# Patient Record
Sex: FEMALE | Race: WHITE | NOT HISPANIC OR LATINO | URBAN - METROPOLITAN AREA
[De-identification: names, ages, dates, MRNs, and addresses within clinical notes are randomized per-mention and may not be internally consistent; named-entity substitution may affect disease eponyms.]

---

## 2017-03-07 ENCOUNTER — NEW REFERRAL (OUTPATIENT)
Dept: URBAN - METROPOLITAN AREA CLINIC 8 | Facility: CLINIC | Age: 82
End: 2017-03-07

## 2017-03-07 DIAGNOSIS — H25.9: ICD-10-CM

## 2017-03-07 DIAGNOSIS — H34.8320: ICD-10-CM

## 2017-03-07 DIAGNOSIS — H35.352: ICD-10-CM

## 2017-03-07 PROCEDURE — 92134 CPTRZ OPH DX IMG PST SGM RTA: CPT

## 2017-03-07 PROCEDURE — 92225 OPHTHALMOSCOPY (INITIAL): CPT

## 2017-03-07 PROCEDURE — G8482 FLU IMMUNIZE ORDER/ADMIN: HCPCS

## 2017-03-07 PROCEDURE — 99204 OFFICE O/P NEW MOD 45 MIN: CPT

## 2017-03-07 PROCEDURE — 1036F TOBACCO NON-USER: CPT

## 2017-03-07 PROCEDURE — 4040F PNEUMOC VAC/ADMIN/RCVD: CPT | Mod: 8P

## 2017-03-07 PROCEDURE — G8427 DOCREV CUR MEDS BY ELIG CLIN: HCPCS

## 2017-03-07 ASSESSMENT — TONOMETRY
OS_IOP_MMHG: 14
OD_IOP_MMHG: 15

## 2017-03-07 ASSESSMENT — VISUAL ACUITY
OS_SC: CF 1FT
OD_CC: 20/25-2
OS_CC: 20/800
OD_SC: 20/50

## 2020-10-21 ENCOUNTER — NEW REFERRAL (OUTPATIENT)
Dept: URBAN - METROPOLITAN AREA CLINIC 8 | Facility: CLINIC | Age: 85
End: 2020-10-21

## 2020-10-21 VITALS — HEIGHT: 55 IN

## 2020-10-21 DIAGNOSIS — H34.8320: ICD-10-CM

## 2020-10-21 PROCEDURE — 92134 CPTRZ OPH DX IMG PST SGM RTA: CPT

## 2020-10-21 PROCEDURE — PFS EYLEA PFS

## 2020-10-21 PROCEDURE — 92202 OPSCPY EXTND ON/MAC DRAW: CPT

## 2020-10-21 PROCEDURE — 67028 INJECTION EYE DRUG: CPT

## 2020-10-21 PROCEDURE — 92004 COMPRE OPH EXAM NEW PT 1/>: CPT | Mod: 25

## 2020-10-21 ASSESSMENT — TONOMETRY
OS_IOP_MMHG: 13
OD_IOP_MMHG: 13

## 2020-10-21 ASSESSMENT — VISUAL ACUITY
OS_SC: 4/200
OS_CC: 20/200
OD_CC: 20/40
OD_SC: 20/50-2

## 2021-10-20 ENCOUNTER — FOLLOW UP (OUTPATIENT)
Dept: URBAN - METROPOLITAN AREA CLINIC 8 | Facility: CLINIC | Age: 86
End: 2021-10-20

## 2021-10-20 DIAGNOSIS — H25.9: ICD-10-CM

## 2021-10-20 DIAGNOSIS — Z96.1: ICD-10-CM

## 2021-10-20 DIAGNOSIS — H34.8320: ICD-10-CM

## 2021-10-20 PROCEDURE — 92014 COMPRE OPH EXAM EST PT 1/>: CPT | Mod: 25

## 2021-10-20 PROCEDURE — 67028 INJECTION EYE DRUG: CPT

## 2021-10-20 PROCEDURE — 92134 CPTRZ OPH DX IMG PST SGM RTA: CPT

## 2021-10-20 ASSESSMENT — TONOMETRY
OS_IOP_MMHG: 12
OD_IOP_MMHG: 15

## 2021-10-20 ASSESSMENT — VISUAL ACUITY
OD_SC: 20/150
OS_SC: 2/200

## 2022-11-04 ENCOUNTER — INPATIENT (INPATIENT)
Facility: HOSPITAL | Age: 87
LOS: 6 days | Discharge: NURSING, REHAB OR LONG-TERM CARE | End: 2022-11-11
Attending: INTERNAL MEDICINE | Admitting: INTERNAL MEDICINE

## 2022-11-04 VITALS
RESPIRATION RATE: 18 BRPM | TEMPERATURE: 98 F | SYSTOLIC BLOOD PRESSURE: 156 MMHG | DIASTOLIC BLOOD PRESSURE: 67 MMHG | OXYGEN SATURATION: 96 % | HEART RATE: 82 BPM

## 2022-11-04 LAB
ALBUMIN SERPL ELPH-MCNC: 3.3 G/DL — LOW (ref 3.5–5.2)
ALP SERPL-CCNC: 66 U/L — SIGNIFICANT CHANGE UP (ref 30–115)
ALT FLD-CCNC: 29 U/L — SIGNIFICANT CHANGE UP (ref 0–41)
ANION GAP SERPL CALC-SCNC: 11 MMOL/L — SIGNIFICANT CHANGE UP (ref 7–14)
ANISOCYTOSIS BLD QL: SLIGHT — SIGNIFICANT CHANGE UP
APPEARANCE UR: ABNORMAL
AST SERPL-CCNC: 21 U/L — SIGNIFICANT CHANGE UP (ref 0–41)
BACTERIA # UR AUTO: NEGATIVE — SIGNIFICANT CHANGE UP
BASOPHILS # BLD AUTO: 0 K/UL — SIGNIFICANT CHANGE UP (ref 0–0.2)
BASOPHILS NFR BLD AUTO: 0 % — SIGNIFICANT CHANGE UP (ref 0–1)
BILIRUB SERPL-MCNC: 0.4 MG/DL — SIGNIFICANT CHANGE UP (ref 0.2–1.2)
BILIRUB UR-MCNC: NEGATIVE — SIGNIFICANT CHANGE UP
BUN SERPL-MCNC: 20 MG/DL — SIGNIFICANT CHANGE UP (ref 10–20)
CALCIUM SERPL-MCNC: 9 MG/DL — SIGNIFICANT CHANGE UP (ref 8.4–10.4)
CHLORIDE SERPL-SCNC: 95 MMOL/L — LOW (ref 98–110)
CO2 SERPL-SCNC: 35 MMOL/L — HIGH (ref 17–32)
COLOR SPEC: YELLOW — SIGNIFICANT CHANGE UP
CREAT SERPL-MCNC: 0.7 MG/DL — SIGNIFICANT CHANGE UP (ref 0.7–1.5)
DIFF PNL FLD: ABNORMAL
EGFR: 84 ML/MIN/1.73M2 — SIGNIFICANT CHANGE UP
EOSINOPHIL # BLD AUTO: 0 K/UL — SIGNIFICANT CHANGE UP (ref 0–0.7)
EOSINOPHIL NFR BLD AUTO: 0 % — SIGNIFICANT CHANGE UP (ref 0–8)
EPI CELLS # UR: 0 /HPF — SIGNIFICANT CHANGE UP (ref 0–5)
GLUCOSE SERPL-MCNC: 94 MG/DL — SIGNIFICANT CHANGE UP (ref 70–99)
GLUCOSE UR QL: NEGATIVE — SIGNIFICANT CHANGE UP
HCT VFR BLD CALC: 30.7 % — LOW (ref 37–47)
HGB BLD-MCNC: 10.1 G/DL — LOW (ref 12–16)
HYALINE CASTS # UR AUTO: 2 /LPF — SIGNIFICANT CHANGE UP (ref 0–7)
KETONES UR-MCNC: NEGATIVE — SIGNIFICANT CHANGE UP
LEUKOCYTE ESTERASE UR-ACNC: ABNORMAL
LYMPHOCYTES # BLD AUTO: 0.93 K/UL — LOW (ref 1.2–3.4)
LYMPHOCYTES # BLD AUTO: 18.3 % — LOW (ref 20.5–51.1)
MANUAL SMEAR VERIFICATION: SIGNIFICANT CHANGE UP
MCHC RBC-ENTMCNC: 32.7 PG — HIGH (ref 27–31)
MCHC RBC-ENTMCNC: 32.9 G/DL — SIGNIFICANT CHANGE UP (ref 32–37)
MCV RBC AUTO: 99.4 FL — HIGH (ref 81–99)
MICROCYTES BLD QL: SLIGHT — SIGNIFICANT CHANGE UP
MONOCYTES # BLD AUTO: 0.35 K/UL — SIGNIFICANT CHANGE UP (ref 0.1–0.6)
MONOCYTES NFR BLD AUTO: 6.9 % — SIGNIFICANT CHANGE UP (ref 1.7–9.3)
MYELOCYTES NFR BLD: 0.9 % — HIGH (ref 0–0)
NEUTROPHILS # BLD AUTO: 3.62 K/UL — SIGNIFICANT CHANGE UP (ref 1.4–6.5)
NEUTROPHILS NFR BLD AUTO: 71.3 % — SIGNIFICANT CHANGE UP (ref 42.2–75.2)
NITRITE UR-MCNC: POSITIVE
NRBC # BLD: 1 /100 — HIGH (ref 0–0)
NRBC # BLD: SIGNIFICANT CHANGE UP /100 WBCS (ref 0–0)
OVALOCYTES BLD QL SMEAR: SLIGHT — SIGNIFICANT CHANGE UP
PH UR: 6 — SIGNIFICANT CHANGE UP (ref 5–8)
PLAT MORPH BLD: NORMAL — SIGNIFICANT CHANGE UP
PLATELET # BLD AUTO: 212 K/UL — SIGNIFICANT CHANGE UP (ref 130–400)
POIKILOCYTOSIS BLD QL AUTO: SLIGHT — SIGNIFICANT CHANGE UP
POLYCHROMASIA BLD QL SMEAR: SLIGHT — SIGNIFICANT CHANGE UP
POTASSIUM SERPL-MCNC: 4.1 MMOL/L — SIGNIFICANT CHANGE UP (ref 3.5–5)
POTASSIUM SERPL-SCNC: 4.1 MMOL/L — SIGNIFICANT CHANGE UP (ref 3.5–5)
PROT SERPL-MCNC: 5.7 G/DL — LOW (ref 6–8)
PROT UR-MCNC: ABNORMAL
RBC # BLD: 3.09 M/UL — LOW (ref 4.2–5.4)
RBC # FLD: 16.2 % — HIGH (ref 11.5–14.5)
RBC BLD AUTO: NORMAL — SIGNIFICANT CHANGE UP
RBC CASTS # UR COMP ASSIST: 21 /HPF — HIGH (ref 0–4)
SMUDGE CELLS # BLD: PRESENT — SIGNIFICANT CHANGE UP
SODIUM SERPL-SCNC: 141 MMOL/L — SIGNIFICANT CHANGE UP (ref 135–146)
SP GR SPEC: 1.02 — SIGNIFICANT CHANGE UP (ref 1.01–1.03)
UROBILINOGEN FLD QL: SIGNIFICANT CHANGE UP
VARIANT LYMPHS # BLD: 2.6 % — SIGNIFICANT CHANGE UP (ref 0–5)
WBC # BLD: 5.08 K/UL — SIGNIFICANT CHANGE UP (ref 4.8–10.8)
WBC # FLD AUTO: 5.08 K/UL — SIGNIFICANT CHANGE UP (ref 4.8–10.8)
WBC UR QL: 207 /HPF — HIGH (ref 0–5)

## 2022-11-04 PROCEDURE — 71045 X-RAY EXAM CHEST 1 VIEW: CPT | Mod: 26

## 2022-11-04 PROCEDURE — 70450 CT HEAD/BRAIN W/O DYE: CPT | Mod: 26,MA

## 2022-11-04 PROCEDURE — 99223 1ST HOSP IP/OBS HIGH 75: CPT

## 2022-11-04 PROCEDURE — 93010 ELECTROCARDIOGRAM REPORT: CPT

## 2022-11-04 PROCEDURE — 99285 EMERGENCY DEPT VISIT HI MDM: CPT | Mod: CS

## 2022-11-04 PROCEDURE — 95816 EEG AWAKE AND DROWSY: CPT | Mod: 26

## 2022-11-04 RX ORDER — DILTIAZEM HCL 120 MG
360 CAPSULE, EXT RELEASE 24 HR ORAL DAILY
Refills: 0 | Status: DISCONTINUED | OUTPATIENT
Start: 2022-11-04 | End: 2022-11-11

## 2022-11-04 RX ORDER — CEFTRIAXONE 500 MG/1
1000 INJECTION, POWDER, FOR SOLUTION INTRAMUSCULAR; INTRAVENOUS ONCE
Refills: 0 | Status: COMPLETED | OUTPATIENT
Start: 2022-11-04 | End: 2022-11-04

## 2022-11-04 RX ORDER — PREGABALIN 225 MG/1
1000 CAPSULE ORAL DAILY
Refills: 0 | Status: DISCONTINUED | OUTPATIENT
Start: 2022-11-04 | End: 2022-11-10

## 2022-11-04 RX ORDER — HEPARIN SODIUM 5000 [USP'U]/ML
5000 INJECTION INTRAVENOUS; SUBCUTANEOUS EVERY 8 HOURS
Refills: 0 | Status: DISCONTINUED | OUTPATIENT
Start: 2022-11-04 | End: 2022-11-11

## 2022-11-04 RX ORDER — AZITHROMYCIN 500 MG/1
500 TABLET, FILM COATED ORAL EVERY 24 HOURS
Refills: 0 | Status: DISCONTINUED | OUTPATIENT
Start: 2022-11-05 | End: 2022-11-07

## 2022-11-04 RX ORDER — FUROSEMIDE 40 MG
40 TABLET ORAL DAILY
Refills: 0 | Status: DISCONTINUED | OUTPATIENT
Start: 2022-11-04 | End: 2022-11-05

## 2022-11-04 RX ORDER — MECLIZINE HCL 12.5 MG
12.5 TABLET ORAL THREE TIMES A DAY
Refills: 0 | Status: DISCONTINUED | OUTPATIENT
Start: 2022-11-04 | End: 2022-11-10

## 2022-11-04 RX ORDER — SODIUM CHLORIDE 9 MG/ML
1000 INJECTION, SOLUTION INTRAVENOUS ONCE
Refills: 0 | Status: COMPLETED | OUTPATIENT
Start: 2022-11-04 | End: 2022-11-04

## 2022-11-04 RX ORDER — CEFTRIAXONE 500 MG/1
1000 INJECTION, POWDER, FOR SOLUTION INTRAMUSCULAR; INTRAVENOUS EVERY 24 HOURS
Refills: 0 | Status: ACTIVE | OUTPATIENT
Start: 2022-11-04 | End: 2022-11-09

## 2022-11-04 RX ORDER — BUDESONIDE AND FORMOTEROL FUMARATE DIHYDRATE 160; 4.5 UG/1; UG/1
1 AEROSOL RESPIRATORY (INHALATION)
Refills: 0 | Status: DISCONTINUED | OUTPATIENT
Start: 2022-11-04 | End: 2022-11-11

## 2022-11-04 RX ORDER — PANTOPRAZOLE SODIUM 20 MG/1
40 TABLET, DELAYED RELEASE ORAL
Refills: 0 | Status: DISCONTINUED | OUTPATIENT
Start: 2022-11-04 | End: 2022-11-11

## 2022-11-04 RX ORDER — AZITHROMYCIN 500 MG/1
500 TABLET, FILM COATED ORAL ONCE
Refills: 0 | Status: COMPLETED | OUTPATIENT
Start: 2022-11-04 | End: 2022-11-04

## 2022-11-04 RX ORDER — AZITHROMYCIN 500 MG/1
TABLET, FILM COATED ORAL
Refills: 0 | Status: DISCONTINUED | OUTPATIENT
Start: 2022-11-04 | End: 2022-11-07

## 2022-11-04 RX ORDER — ACETAMINOPHEN 500 MG
650 TABLET ORAL EVERY 6 HOURS
Refills: 0 | Status: DISCONTINUED | OUTPATIENT
Start: 2022-11-04 | End: 2022-11-11

## 2022-11-04 RX ORDER — FUROSEMIDE 40 MG
1 TABLET ORAL
Qty: 0 | Refills: 0 | DISCHARGE

## 2022-11-04 RX ORDER — LANOLIN ALCOHOL/MO/W.PET/CERES
3 CREAM (GRAM) TOPICAL AT BEDTIME
Refills: 0 | Status: DISCONTINUED | OUTPATIENT
Start: 2022-11-04 | End: 2022-11-10

## 2022-11-04 RX ORDER — SUCRALFATE 1 G
1000 TABLET ORAL
Refills: 0 | Status: DISCONTINUED | OUTPATIENT
Start: 2022-11-04 | End: 2022-11-11

## 2022-11-04 RX ORDER — SIMVASTATIN 20 MG/1
10 TABLET, FILM COATED ORAL AT BEDTIME
Refills: 0 | Status: DISCONTINUED | OUTPATIENT
Start: 2022-11-04 | End: 2022-11-11

## 2022-11-04 RX ADMIN — SODIUM CHLORIDE 1000 MILLILITER(S): 9 INJECTION, SOLUTION INTRAVENOUS at 18:11

## 2022-11-04 NOTE — H&P ADULT - ASSESSMENT
87 years old Female with PMHx of HTN, Afib (on cardizem for rate control on heparin sc as of 3 weeks ago) NHL now in remission, COPD (was not on home oxygen until 3 weeks ago 2 L) who presents with the chief complaint of altered mental status.       # AMS secondary to infectious vs metabolic  - UA positive   - s/p ceftriaxone in ED  - c/w ceftriaxone  - No gfever  - WBC 5.08  - Neuro recc appreciated       REEG  - Check B12, Folate and TSH      #CHF?   - LE edema and coarse breath sounds  - lasix 40 Iv qd  - f/u trops and BNP  - f/u CXR   - Check ECG  -check ECho    #Hx of afib  - c/w cardizem  - on heparin sc    #Hx of NHL   - f/u as outpt      #Misc  - DVT Prophylaxis: Heparin S/C   - GI Prophylaxis: sucralfate and protonix  - Diet: NPO , S & swallow eval  - Dispo: Acute    87 years old Female with PMHx of HTN, Afib (on cardizem for rate control on heparin sc as of 3 weeks ago) NHL now in remission, COPD (was not on home oxygen until 3 weeks ago 2 L) who presents with the chief complaint of altered mental status.       # AMS secondary to infectious vs metabolic  - UA positive   - s/p ceftriaxone in ED  - c/w ceftriaxone  - No gfever  - WBC 5.08  - Neuro recc appreciated       REEG  - Check B12, Folate and TSH      # New Dx of CHF?   - LE edema and coarse breath sounds  - lasix 40 Iv qd  - f/u trops and BNP  - f/u CXR   - Check ECG  -check ECho  - strict intake output  - daily weights  - fluid restriction    #Hx of afib  - c/w cardizem  - on heparin sc    #Hx of NHL   - f/u as outpt      #Misc  - DVT Prophylaxis: Heparin S/C   - GI Prophylaxis: sucralfate and protonix  - Diet: NPO , S & swallow eval  - Dispo: Acute    87 years old Female with PMHx of HTN, Afib (on cardizem for rate control on heparin sc as of 3 weeks ago) NHL now in remission, COPD (was not on home oxygen until 3 weeks ago 2 L) who presents with the chief complaint of altered mental status.       # AMS secondary to infectious vs metabolic  - UA positive   - s/p ceftriaxone in ED  - c/w ceftriaxone  - No gfever  - WBC 5.08  - Neuro recc appreciated       REEG  - Check B12, Folate and TSH      # AHRF sec to New Dx of CHF vs COPD exacerbation?   - LE edema and coarse breath sounds  - lasix 40 Iv qd  - f/u trops and BNP  - f/u CXR and repeat in am  - start on ceftriaxone and azithromycin  - Check ECG  - check ECho  - strict intake output  - daily weights  - fluid restriction    #Hx of afib  - c/w cardizem  - on heparin sc    #Hx of NHL   - f/u as outpt      #Misc  - DVT Prophylaxis: Heparin S/C   - GI Prophylaxis: sucralfate and protonix  - Diet: NPO , S & swallow eval  - Dispo: Acute    87 years old Female with PMHx of HTN, Afib (on cardizem for rate control on heparin sc as of 3 weeks ago) NHL now in remission, COPD (was not on home oxygen until 3 weeks ago 2 L) who presents with the chief complaint of altered mental status.       # AMS secondary to infectious vs metabolic  - UA positive   - s/p ceftriaxone in ED  - c/w ceftriaxone  - No gfever  - WBC 5.08  - Neuro recc appreciated       REEG  - Check B12, Folate and TSH      # AHRF sec to New Dx of CHF vs COPD exacerbation?   - LE edema and coarse breath sounds  - lasix 40 Iv qd  - f/u trops and BNP  - f/u CXR and repeat in am  - start on ceftriaxone and azithromycin  - Check ECG  - check ECho  - strict intake output  - daily weights  - fluid restriction    #Hx of afib  - c/w cardizem  - not on A/C    #Hx of NHL   - f/u as outpt      #Misc  - DVT Prophylaxis: Heparin S/C   - GI Prophylaxis: sucralfate and protonix  - Diet: NPO , S & swallow eval  - Dispo: Acute

## 2022-11-04 NOTE — CONSULT NOTE ADULT - ATTENDING COMMENTS
Agree with resident, no focality. Awake and alert in person, slow to respond, but no slurred speech.  Continue ID treatment  can do routine EEG, if normal, then treat metabolic/ID issues  will follow as needed

## 2022-11-04 NOTE — CONSULT NOTE ADULT - ASSESSMENT
The patient 87y old female brought for not being able to talk after she was asleep for the last two days as per daughter. Exam showed no clear focality, but she was not answering questions. CTH showed no acute intracranial lesions, making stroke less likely. Her abnormal UA, and abnormal breathing (she is on nasal cannula) makes toxic/metabolic etiology more likely. Seizure activity is less likely but to be considered.     Recommendations:  - Treat her toxic/metabolic issues   - B12 level   - rEEG   - If no improvement on treatment may consider MR brain         Thank you for sharing this patient with me; please do not hesitate to contact me in case of any question.

## 2022-11-04 NOTE — H&P ADULT - ATTENDING COMMENTS
87 years old Female with PMHx of HTN, Afib (on cardizem for rate control on heparin sc as of 3 weeks ago) NHL now in remission, COPD (was not on home oxygen until 3 weeks ago 2 L) who presents with the chief complaint of altered mental status.    IMPRESSION  Metabolic Encephalopathy  Sinusitis  Acute on Chronic Mild COPD Exacerbation  Acute Cystitis   Anemia   Hx HTN   Hx Paroxymal Afib   Hx COPD on 2L      Plan   Titrate supplemental O2 to maintain SpO2 92-96%; avoid hyperoxia and wean off O2 gradually. Symbicort 160-4.5mcg 2 puffs BID; Spiriva 2.5 2 puffss daily. Prednisone taper  Nebulizer treatment Q4H standing.   F/u Blood CX, Urine Cx, MRSA, Urine strep and legionella, Procal   f/u REEG  f/u B12   c/w abx  Fall precaution  Frequent reorientation 87 years old Female with PMHx of HTN, Afib (on cardizem for rate control on heparin sc as of 3 weeks ago) NHL now in remission, COPD (was not on home oxygen until 3 weeks ago 2 L) who presents with the chief complaint of altered mental status.      VITAL SIGNS: AFebrile, vital signs stable  CONSTITUTIONAL: Well-developed; well-nourished; in no acute distress.  SKIN: Skin exam is warm and dry, no acute rash.  HEAD: Normocephalic; atraumatic.  EYES: Pupils equal round reactive to light, Extraocular movements intact; conjunctiva and sclera clear.  ENT: No nasal discharge; airway clear. Moist mucus membranes.  NECK: Supple; non tender. No rigidity  CARD: Regular rate and rhythm. Normal S1, S2; no murmurs, gallops, or rubs.  RESP: Lungs clear to auscultation bilaterally. No wheezes, rales or rhonchi.  ABD: Abdomen soft; non-tender; non-distended;  no hepatosplenomegaly. No costovertebral angle tenderness.   EXT: Normal ROM. No clubbing, cyanosis or edema. No calf tenderness to palpation.  NEURO: Alert and oriented x 0. No focal deficits.  PSYCH: Cooperative, appropriate.     IMPRESSION  Metabolic Encephalopathy  Sinusitis  Acute on Chronic Mild COPD Exacerbation  Acute Cystitis   Anemia   Hx HTN   Hx Paroxymal Afib   Hx COPD on 2L      Plan   Titrate supplemental O2 to maintain SpO2 92-96%; avoid hyperoxia and wean off O2 gradually. Symbicort 160-4.5mcg 2 puffs BID; Spiriva 2.5 2 puffss daily. Prednisone taper  Nebulizer treatment Q4H standing.   F/u Blood CX, Urine Cx, MRSA, Urine strep and legionella, Procal   f/u REEG  f/u B12   c/w abx  Fall precaution  Frequent reorientation    seen 11/4

## 2022-11-04 NOTE — ED PROVIDER NOTE - CLINICAL SUMMARY MEDICAL DECISION MAKING FREE TEXT BOX
87-year-old female past medical history of hypertension, A. fib not on anticoagulation, non-Hodgkin's lymphoma in remission, COPD formerly on home oxygen presents to the emergency department with decreased responsiveness lethargy.  Patient was hypoxic and started on supplemental oxygen in the ED.  Full labs sent and patient has normal white blood cell count.  Patient has UTI was treated.  ED work-up reviewed and will admit for further work-up and management, supplemental oxygen.  Head CT shows no acute changes.  Patient given IV ceftriaxone.  Patient had recent hospital admissions and was COVID-positive and had pneumonia.  ED work up reviewed and results and plan of care discussed with patient. Patient requires admission for further work up, monitoring, and management. Need for admission discussed with patient.

## 2022-11-04 NOTE — ED PROVIDER NOTE - PHYSICAL EXAMINATION
CONSTITUTIONAL: Well-developed; well-nourished; in no acute distress.   SKIN: Warm, dry  HEAD: Normocephalic; atraumatic  EYES: PERRL, EOMI, normal sclera and conjunctiva. No conjunctival pallor.   ENT: No nasal discharge; airway clear.  CARD:  Regular rate and rhythm. Normal S1, S2  RESP: No increased WOB. CTA b/l without wheezes, crackles, rhonchi  ABD: Soft, nontender, nondistended. Brown stool on MCKENNA.  EXT: Normal ROM.   NEURO: Alert, follows commands. Not speaking.   PSYCH: Cooperative, appropriate.

## 2022-11-04 NOTE — H&P ADULT - HISTORY OF PRESENT ILLNESS
87y old  Female who presents with a chief complaint of not talking. The patient was recently admitted for covid infection, and had a Henry catheter which is still in place. She was sent to a nursing home to help her improve As per daughter at bedside, she was asleep for the last two days and when woke up today she was not able to talk.    87 years old Female with PMHx of HTN, NHL now in remission, COPD (was not on home oxygen until 3 weeks ago 2 L) who presents with the chief complaint of altered mental status. Pt at her baseline talks and is AAO x3 but according to the daughters she stopped talking two days ago. Hx goes back to 3 weeks ago when she was admitted to [facility] [place] for covid and was started on Oxygen and steroid taper. Pt recovered well but was retaining urine so a Henry was placed at that time and pt was sent to [facility]. Pt was doing fine until 2 days ago when she stopped talking at all.    In the Ed pt VS: T 97.8, HR 80, /67 and oxygen sat 93% on 3 L  Labs showed WBC 5.08, hg 10.1, Bicarb 35,   UA was positive on admission.  Pt is admitted for further w/u and managemnt 87 years old Female with PMHx of HTN, Afib (on cardizem for rate control on heparin sc as of 3 weeks ago) NHL now in remission, COPD (was not on home oxygen until 3 weeks ago 2 L) who presents with the chief complaint of altered mental status. Pt at her baseline talks and is AAO x3 but according to the daughters she stopped talking two days ago. Hx goes back to 3 weeks ago when she was admitted to [facility] [place] for covid and was started on Oxygen and steroid taper. Pt recovered well but was retaining urine so a Henry was placed at that time and pt was sent to [facility]. Pt was doing fine until 2 days ago when she stopped talking at all.    In the Ed pt VS: T 97.8, HR 80, /67 and oxygen sat 93% on 3 L  Labs showed WBC 5.08, hg 10.1, Bicarb 35,   UA was positive on admission.  Pt is admitted for further w/u and managemnt 87 years old Female with PMHx of HTN, Afib (on cardizem for rate control not on A/C only on heparin sc as of 3 weeks ago) NHL now in remission, COPD (was not on home oxygen until 3 weeks ago 2 L) who presents with the chief complaint of altered mental status. Pt at her baseline talks and is AAO x3 but according to the daughters she stopped talking two days ago. Hx goes back to 3 weeks ago when she was admitted to [facility] [place] for covid and was started on Oxygen and steroid taper. Pt recovered well but was retaining urine so a Henry was placed at that time and pt was sent to [facility]. Pt was doing fine until 2 days ago when she stopped talking at all.    In the Ed pt VS: T 97.8, HR 80, /67 and oxygen sat 93% on 3 L  Labs showed WBC 5.08, hg 10.1, Bicarb 35,   UA was positive on admission.  Pt is admitted for further w/u and managemnt 87 years old Female with PMHx of HTN, Afib (on cardizem for rate control not on A/C only on heparin sc as of 3 weeks ago) NHL now in remission, COPD (was not on home oxygen until 3 weeks ago 2 L) who presents with the chief complaint of altered mental status. Pt at her baseline talks and is AAO x3 but according to the daughters she stopped talking two days ago. Hx goes back to 3 weeks ago when she was admitted to [facility] at [place] for covid and was started on supplemental Oxygen and steroid taper. Pt recovered well but was retaining urine so a Henry was placed at that time and pt was sent to [facility]. Pt was doing fine until 2 days ago when she stopped talking at all.    In the Ed pt VS: T 97.8, HR 80, /67 and oxygen sat 93% on 3 L  Labs showed WBC 5.08, hg 10.1, Bicarb 35,   UA was positive on admission.  Pt is admitted for further w/u and managemnt

## 2022-11-04 NOTE — H&P ADULT - NSHPPHYSICALEXAM_GEN_ALL_CORE
GENERAL: NAD, lying in bed comfortably  NECK: Supple, No JVD  CHEST/LUNG: Clear to auscultation bilaterally; No rales, rhonchi, wheezing, or rubs. Unlabored respirations  HEART: Regular rate and rhythm; No murmurs, rubs, or gallops  ABDOMEN: Bowel sounds present; Soft, Nontender, Nondistended. No hepatomegaly  EXTREMITIES:  2+ Peripheral Pulses, brisk capillary refill. No clubbing, cyanosis, or edema  NERVOUS SYSTEM:  Alert & Oriented X3, speech clear. No deficits   MSK: FROM all 4 extremities, full and equal strength  SKIN: No rashes or lesions GENERAL: NAD, lying in bed  NC 2L  NECK: Supple, No JVD  CHEST/LUNG: BL decreased BS  HEART: irregular rate and rhythm; No murmurs, rubs, or gallops  ABDOMEN: Bowel sounds present; Soft, Nontender, Nondistended. No hepatomegaly  EXTREMITIES:  2+ Peripheral Pulses, 1+ edema  NERVOUS SYSTEM:  Alert & Oriented X 0, unable to follow command, responds to pain

## 2022-11-04 NOTE — ED ADULT TRIAGE NOTE - CHIEF COMPLAINT QUOTE
Patient bibems from [facility] awake and alert as per daughter patient with "slow decline over last few days in mental status", daughter endorses pt barely wants to eat or speak.

## 2022-11-04 NOTE — H&P ADULT - NSHPLABSRESULTS_GEN_ALL_CORE
10.1   5.08  )-----------( 212      ( 2022 15:52 )             30.7       11-04    141  |  95<L>  |  20  ----------------------------<  94  4.1   |  35<H>  |  0.7    Ca    9.0      2022 15:52    TPro  5.7<L>  /  Alb  3.3<L>  /  TBili  0.4  /  DBili  x   /  AST  21  /  ALT  29  /  AlkPhos  66  11-04              Urinalysis Basic - ( 2022 16:27 )    Color: Yellow / Appearance: Slightly Turbid / S.019 / pH: x  Gluc: x / Ketone: Negative  / Bili: Negative / Urobili: <2 mg/dL   Blood: x / Protein: 30 mg/dL / Nitrite: Positive   Leuk Esterase: Large / RBC: 21 /HPF /  /HPF   Sq Epi: x / Non Sq Epi: 0 /HPF / Bacteria: Negative            Lactate Trend            CAPILLARY BLOOD GLUCOSE

## 2022-11-04 NOTE — CONSULT NOTE ADULT - SUBJECTIVE AND OBJECTIVE BOX
Neurology Consult    Patient is a 87y old  Female who presents with a chief complaint of not taking. The patient was recently admitted for covid infection, and had a Henry catheter which is still in place. She was sent to a nursing home to help her improve As per daughter at bedside, she was asleep for the last two days and when woke up today she was not able to talk.     HPI:      PAST MEDICAL & SURGICAL HISTORY:      FAMILY HISTORY:      Social History: (-) x 3    Allergies    No Known Allergies    Intolerances        MEDICATIONS  (STANDING):  lactated ringers Bolus 1000 milliLiter(s) IV Bolus once    MEDICATIONS  (PRN):      Review of systems:    Unable to assess    Vital Signs Last 24 Hrs  T(C): 36.6 (2022 16:39), Max: 37.7 (2022 15:48)  T(F): 97.8 (2022 16:39), Max: 99.8 (2022 15:48)  HR: 80 (2022 16:39) (80 - 82)  BP: 153/67 (2022 16:39) (153/67 - 156/67)  BP(mean): --  RR: 18 (2022 16:39) (18 - 18)  SpO2: 93% (2022 16:39) (93% - 96%)    Parameters below as of 2022 16:39  Patient On (Oxygen Delivery Method): nasal cannula  O2 Flow (L/min): 3      Examination:  General:  Appearance is consistent with chronologic age.  No abnormal facies.  Gross skin survey within normal limits.    Cognitive/Language:  The patient is not taking, but follow simple commands, showing two fingers, and follow moving objects     Eyes: intact VFF grossly to threaten reflex. EOMI w/o nystagmus, skew or reported double vision.  PERRL.  No ptosis/weakness of eyelid closure.    Face:  no facial asymmetry.    Motor examination:  Normal tone, bulk and range of motion.  No tenderness, twitching, tremors or involuntary movements.  Formal Muscle Strength Testin/5 arms, she was not moving her legs to commands, but withdraw bilaterally to noxious stimuli  Reflexes: 2+ b/l biceps, triceps, brachioradialis, patella and Achilles.  Plantar response downgoing b/l.   Sensory examination:  response to noxious stimuli in all 4 limbs  Gait deferred         Labs:   CBC Full  -  ( 2022 15:52 )  WBC Count : 5.08 K/uL  RBC Count : 3.09 M/uL  Hemoglobin : 10.1 g/dL  Hematocrit : 30.7 %  Platelet Count - Automated : 212 K/uL  Mean Cell Volume : 99.4 fL  Mean Cell Hemoglobin : 32.7 pg  Mean Cell Hemoglobin Concentration : 32.9 g/dL  Auto Neutrophil # : 3.62 K/uL  Auto Lymphocyte # : 0.93 K/uL  Auto Monocyte # : 0.35 K/uL  Auto Eosinophil # : 0.00 K/uL  Auto Basophil # : 0.00 K/uL  Auto Neutrophil % : 71.3 %  Auto Lymphocyte % : 18.3 %  Auto Monocyte % : 6.9 %  Auto Eosinophil % : 0.0 %  Auto Basophil % : 0.0 %        141  |  95<L>  |  20  ----------------------------<  94  4.1   |  35<H>  |  0.7    Ca    9.0      2022 15:52    TPro  5.7<L>  /  Alb  3.3<L>  /  TBili  0.4  /  DBili  x   /  AST  21  /  ALT  29  /  AlkPhos  66  11    LIVER FUNCTIONS - ( 2022 15:52 )  Alb: 3.3 g/dL / Pro: 5.7 g/dL / ALK PHOS: 66 U/L / ALT: 29 U/L / AST: 21 U/L / GGT: x             Urinalysis Basic - ( 2022 16:27 )    Color: Yellow / Appearance: Slightly Turbid / S.019 / pH: x  Gluc: x / Ketone: Negative  / Bili: Negative / Urobili: <2 mg/dL   Blood: x / Protein: 30 mg/dL / Nitrite: Positive   Leuk Esterase: Large / RBC: 21 /HPF /  /HPF   Sq Epi: x / Non Sq Epi: 0 /HPF / Bacteria: Negative          Neuroimaging:  NCHCT: CT Head No Cont:   ACC: [id number] EXAM:  CT BRAIN                          PROCEDURE DATE:  2022          INTERPRETATION:  CLINICAL INDICATION: Altered mental status.    Technique: CT of the head was performed without contrast.    Multiple contiguous axial images were acquired from the skullbase to the   vertex without the administration of intravenous contrast.  Coronal and   sagittal reformations were made.    COMPARISON: None    FINDINGS:    The ventricles and sulci are unremarkable in appearance.    There is periventricular and subcortical white matter hypodensity. There   is no intraparenchymal hematoma, mass effect or midline shift. No   abnormal extra-axial fluid collections are present.    The calvarium is intact. Air-fluid levels are noted within the right   maxillary as well as the bilateral sphenoid sinuses.. Remaining. There   has been bilateral cataract surgery.    IMPRESSION:  No evidence of acute intracranial pathology.    Air-fluid levels within the right maxillary and bilateral sphenoid   sinuses, correlate for sinusitis.    Moderate chronic microvascular type changes.    --- End of Report ---            SAUD SAMANO MD; Attending Radiologist  This document has been electronically signed. 2022  2:53PM (22 @ 14:11)      22 @ 17:34

## 2022-11-04 NOTE — ED PROVIDER NOTE - OBJECTIVE STATEMENT
86 y/o F with A fib on heparin, HTN, non-Hodgkin's lymphoma, recently admitted to hospital for COVID pna and discharged to [facility] one month ago presenting from SNF with AMS. PT accompanied by daughter who states that for the past few days patient has become more weak/tired and less responsive and for the past two days pt has not spoken at all. Pt will look at them when they speak to her but does not respond or follow commands. Daughter notes that pt has had a vang catheter for past month since at  and was treated for a UTI at beginning of that month; she is currently still on levaquin for UTI.

## 2022-11-05 LAB
A1C WITH ESTIMATED AVERAGE GLUCOSE RESULT: 6.8 % — HIGH (ref 4–5.6)
ALBUMIN SERPL ELPH-MCNC: 3.1 G/DL — LOW (ref 3.5–5.2)
ALP SERPL-CCNC: 71 U/L — SIGNIFICANT CHANGE UP (ref 30–115)
ALT FLD-CCNC: 27 U/L — SIGNIFICANT CHANGE UP (ref 0–41)
ANION GAP SERPL CALC-SCNC: 12 MMOL/L — SIGNIFICANT CHANGE UP (ref 7–14)
AST SERPL-CCNC: 21 U/L — SIGNIFICANT CHANGE UP (ref 0–41)
BASOPHILS # BLD AUTO: 0.04 K/UL — SIGNIFICANT CHANGE UP (ref 0–0.2)
BASOPHILS NFR BLD AUTO: 0.8 % — SIGNIFICANT CHANGE UP (ref 0–1)
BILIRUB SERPL-MCNC: 0.4 MG/DL — SIGNIFICANT CHANGE UP (ref 0.2–1.2)
BUN SERPL-MCNC: 17 MG/DL — SIGNIFICANT CHANGE UP (ref 10–20)
CALCIUM SERPL-MCNC: 8.6 MG/DL — SIGNIFICANT CHANGE UP (ref 8.4–10.4)
CHLORIDE SERPL-SCNC: 95 MMOL/L — LOW (ref 98–110)
CHOLEST SERPL-MCNC: 226 MG/DL — HIGH
CO2 SERPL-SCNC: 33 MMOL/L — HIGH (ref 17–32)
CREAT SERPL-MCNC: 0.7 MG/DL — SIGNIFICANT CHANGE UP (ref 0.7–1.5)
D DIMER BLD IA.RAPID-MCNC: 668 NG/ML DDU — HIGH (ref 0–230)
EGFR: 84 ML/MIN/1.73M2 — SIGNIFICANT CHANGE UP
EOSINOPHIL # BLD AUTO: 0.01 K/UL — SIGNIFICANT CHANGE UP (ref 0–0.7)
EOSINOPHIL NFR BLD AUTO: 0.2 % — SIGNIFICANT CHANGE UP (ref 0–8)
ESTIMATED AVERAGE GLUCOSE: 148 MG/DL — HIGH (ref 68–114)
FOLATE SERPL-MCNC: 17.7 NG/ML — SIGNIFICANT CHANGE UP
GLUCOSE BLDC GLUCOMTR-MCNC: 156 MG/DL — HIGH (ref 70–99)
GLUCOSE BLDC GLUCOMTR-MCNC: 205 MG/DL — HIGH (ref 70–99)
GLUCOSE SERPL-MCNC: 88 MG/DL — SIGNIFICANT CHANGE UP (ref 70–99)
HCT VFR BLD CALC: 30 % — LOW (ref 37–47)
HDLC SERPL-MCNC: 64 MG/DL — SIGNIFICANT CHANGE UP
HGB BLD-MCNC: 10.2 G/DL — LOW (ref 12–16)
IMM GRANULOCYTES NFR BLD AUTO: 10 % — HIGH (ref 0.1–0.3)
LIPID PNL WITH DIRECT LDL SERPL: 115 MG/DL — HIGH
LYMPHOCYTES # BLD AUTO: 0.69 K/UL — LOW (ref 1.2–3.4)
LYMPHOCYTES # BLD AUTO: 14.4 % — LOW (ref 20.5–51.1)
MAGNESIUM SERPL-MCNC: 1.6 MG/DL — LOW (ref 1.8–2.4)
MCHC RBC-ENTMCNC: 33.2 PG — HIGH (ref 27–31)
MCHC RBC-ENTMCNC: 34 G/DL — SIGNIFICANT CHANGE UP (ref 32–37)
MCV RBC AUTO: 97.7 FL — SIGNIFICANT CHANGE UP (ref 81–99)
MONOCYTES # BLD AUTO: 0.43 K/UL — SIGNIFICANT CHANGE UP (ref 0.1–0.6)
MONOCYTES NFR BLD AUTO: 9 % — SIGNIFICANT CHANGE UP (ref 1.7–9.3)
NEUTROPHILS # BLD AUTO: 3.13 K/UL — SIGNIFICANT CHANGE UP (ref 1.4–6.5)
NEUTROPHILS NFR BLD AUTO: 65.6 % — SIGNIFICANT CHANGE UP (ref 42.2–75.2)
NON HDL CHOLESTEROL: 162 MG/DL — HIGH
NRBC # BLD: 0 /100 WBCS — SIGNIFICANT CHANGE UP (ref 0–0)
NT-PROBNP SERPL-SCNC: 376 PG/ML — HIGH (ref 0–300)
PLATELET # BLD AUTO: 214 K/UL — SIGNIFICANT CHANGE UP (ref 130–400)
POTASSIUM SERPL-MCNC: 3.9 MMOL/L — SIGNIFICANT CHANGE UP (ref 3.5–5)
POTASSIUM SERPL-SCNC: 3.9 MMOL/L — SIGNIFICANT CHANGE UP (ref 3.5–5)
PROT SERPL-MCNC: 5.3 G/DL — LOW (ref 6–8)
RBC # BLD: 3.07 M/UL — LOW (ref 4.2–5.4)
RBC # FLD: 16.6 % — HIGH (ref 11.5–14.5)
SARS-COV-2 RNA SPEC QL NAA+PROBE: DETECTED
SODIUM SERPL-SCNC: 140 MMOL/L — SIGNIFICANT CHANGE UP (ref 135–146)
TRIGL SERPL-MCNC: 235 MG/DL — HIGH
TROPONIN T SERPL-MCNC: 0.03 NG/ML — CRITICAL HIGH
TROPONIN T SERPL-MCNC: 0.03 NG/ML — CRITICAL HIGH
TSH SERPL-MCNC: 4.32 UIU/ML — HIGH (ref 0.27–4.2)
VIT B12 SERPL-MCNC: >2000 PG/ML — HIGH (ref 232–1245)
WBC # BLD: 4.78 K/UL — LOW (ref 4.8–10.8)
WBC # FLD AUTO: 4.78 K/UL — LOW (ref 4.8–10.8)

## 2022-11-05 PROCEDURE — 71045 X-RAY EXAM CHEST 1 VIEW: CPT | Mod: 26

## 2022-11-05 PROCEDURE — 99232 SBSQ HOSP IP/OBS MODERATE 35: CPT

## 2022-11-05 PROCEDURE — 99222 1ST HOSP IP/OBS MODERATE 55: CPT

## 2022-11-05 RX ORDER — MAGNESIUM SULFATE 500 MG/ML
2 VIAL (ML) INJECTION ONCE
Refills: 0 | Status: COMPLETED | OUTPATIENT
Start: 2022-11-05 | End: 2022-11-05

## 2022-11-05 RX ORDER — IPRATROPIUM/ALBUTEROL SULFATE 18-103MCG
3 AEROSOL WITH ADAPTER (GRAM) INHALATION EVERY 6 HOURS
Refills: 0 | Status: DISCONTINUED | OUTPATIENT
Start: 2022-11-05 | End: 2022-11-05

## 2022-11-05 RX ORDER — IPRATROPIUM BROMIDE 0.2 MG/ML
500 SOLUTION, NON-ORAL INHALATION EVERY 6 HOURS
Refills: 0 | Status: ACTIVE | OUTPATIENT
Start: 2022-11-05 | End: 2023-10-04

## 2022-11-05 RX ORDER — FUROSEMIDE 40 MG
40 TABLET ORAL DAILY
Refills: 0 | Status: DISCONTINUED | OUTPATIENT
Start: 2022-11-06 | End: 2022-11-11

## 2022-11-05 RX ADMIN — Medication 25 GRAM(S): at 15:36

## 2022-11-05 RX ADMIN — Medication 500 MICROGRAM(S): at 11:43

## 2022-11-05 RX ADMIN — Medication 360 MILLIGRAM(S): at 06:45

## 2022-11-05 RX ADMIN — Medication 40 MILLIGRAM(S): at 01:21

## 2022-11-05 RX ADMIN — PREGABALIN 1000 MICROGRAM(S): 225 CAPSULE ORAL at 11:38

## 2022-11-05 RX ADMIN — CEFTRIAXONE 100 MILLIGRAM(S): 500 INJECTION, POWDER, FOR SOLUTION INTRAMUSCULAR; INTRAVENOUS at 06:45

## 2022-11-05 RX ADMIN — AZITHROMYCIN 500 MILLIGRAM(S): 500 TABLET, FILM COATED ORAL at 01:13

## 2022-11-05 RX ADMIN — Medication 600 MILLIGRAM(S): at 11:43

## 2022-11-05 RX ADMIN — SIMVASTATIN 10 MILLIGRAM(S): 20 TABLET, FILM COATED ORAL at 23:35

## 2022-11-05 RX ADMIN — Medication 20 MILLIGRAM(S): at 15:42

## 2022-11-05 RX ADMIN — Medication 40 MILLIGRAM(S): at 06:44

## 2022-11-05 RX ADMIN — HEPARIN SODIUM 5000 UNIT(S): 5000 INJECTION INTRAVENOUS; SUBCUTANEOUS at 06:46

## 2022-11-05 RX ADMIN — Medication 12.5 MILLIGRAM(S): at 06:44

## 2022-11-05 RX ADMIN — HEPARIN SODIUM 5000 UNIT(S): 5000 INJECTION INTRAVENOUS; SUBCUTANEOUS at 01:21

## 2022-11-05 RX ADMIN — CEFTRIAXONE 100 MILLIGRAM(S): 500 INJECTION, POWDER, FOR SOLUTION INTRAMUSCULAR; INTRAVENOUS at 01:10

## 2022-11-05 RX ADMIN — Medication 500 MICROGRAM(S): at 20:06

## 2022-11-05 RX ADMIN — AZITHROMYCIN 255 MILLIGRAM(S): 500 TABLET, FILM COATED ORAL at 20:07

## 2022-11-05 RX ADMIN — BUDESONIDE AND FORMOTEROL FUMARATE DIHYDRATE 1 PUFF(S): 160; 4.5 AEROSOL RESPIRATORY (INHALATION) at 20:07

## 2022-11-05 RX ADMIN — Medication 20 MILLIGRAM(S): at 06:48

## 2022-11-05 RX ADMIN — PANTOPRAZOLE SODIUM 40 MILLIGRAM(S): 20 TABLET, DELAYED RELEASE ORAL at 06:49

## 2022-11-05 RX ADMIN — Medication 1000 MILLIGRAM(S): at 11:38

## 2022-11-05 RX ADMIN — Medication 1000 MILLIGRAM(S): at 20:06

## 2022-11-05 RX ADMIN — Medication 40 MILLIGRAM(S): at 07:15

## 2022-11-05 RX ADMIN — Medication 12.5 MILLIGRAM(S): at 01:21

## 2022-11-05 RX ADMIN — HEPARIN SODIUM 5000 UNIT(S): 5000 INJECTION INTRAVENOUS; SUBCUTANEOUS at 15:41

## 2022-11-05 RX ADMIN — Medication 20 MILLIGRAM(S): at 11:38

## 2022-11-05 RX ADMIN — BUDESONIDE AND FORMOTEROL FUMARATE DIHYDRATE 1 PUFF(S): 160; 4.5 AEROSOL RESPIRATORY (INHALATION) at 08:00

## 2022-11-05 RX ADMIN — HEPARIN SODIUM 5000 UNIT(S): 5000 INJECTION INTRAVENOUS; SUBCUTANEOUS at 23:35

## 2022-11-05 NOTE — SWALLOW BEDSIDE ASSESSMENT ADULT - NS SPL SWALLOW CLINIC TRIAL FT
+ toleration observed without overt symptoms of penetration/aspiration. mild oral dysphagia for chewables

## 2022-11-05 NOTE — ED ADULT NURSE REASSESSMENT NOTE - NS ED NURSE REASSESS COMMENT FT1
Pt is tolerating meals and meds. Pt had 2 BM's this shift. Henry functioning well. Pt turned and repositioned Q2. PT is alert and verbal. Vitals stable.

## 2022-11-05 NOTE — SWALLOW BEDSIDE ASSESSMENT ADULT - SLP GENERAL OBSERVATIONS
pt awake however + confusion noted. pt oriented to self and place. pt is english/Mongolian speaking. daughter present at bedside

## 2022-11-05 NOTE — ED ADULT NURSE NOTE - CHIEF COMPLAINT QUOTE
Patient bibems from [facility] awake and alert as per daughter patient with "slow decline over last few days in mental status", daughter endorses pt barely wants to eat or speak.

## 2022-11-05 NOTE — SWALLOW BEDSIDE ASSESSMENT ADULT - DATE
[facility]   Intensive Care Unit Progress Note    Name: Karen Mcelroy        MRN#[id number]  Parents: Mariola and Shankar Mcelroy  YOB: 2018 6:51 AM  Date of Admission: 2018  6:51 AM          History of Present Illness    Gestational Age: 36w0d, appropriate for gestational age, 5 lb 10.3 oz (2560 g), female infant born by Vaginal, Spontaneous Delivery due to gastroschisis with worsening intestinal dilation with concern for IUGR. Our team was asked by Dr. Azevedo of [facility] to care for this infant born at [facility].     Due to prematurity, RDS, concerns for sepsis (meconium stained fluid) and gastroschisis, we were contacted to transport this infant to [facility] NICU for further evaluation and therapy. Karen was intubated prior to transport. Transport was uneventful.    Patient Active Problem List   Diagnosis     Gastroschisis     Prematurity     Respiratory failure in      Need for observation and evaluation of  for sepsis     Other feeding problems of      On total parenteral nutrition        Interval History   No acute events.     Assessment & Plan   Overall Status:  45 day old  female infant, now at 42w3d PMA with gastroschisis s/p repair on advancing enteral feeds.  This patient whose weight is < 5000 grams is no longer critically ill, but requires cardiac/respiratory monitoring, vital sign monitoring, temperature maintenance, enteral feeding adjustments, lab and/or oxygen monitoring and constant observation by the health care team under direct physician supervision.    Access:  PIV   Broviac discussed at length with family - family declines placement at this time given nutritional needs met with pTPN and concern for potential complications and additional surgical procedure. Plan to readdress if growth faltering or unable to meet nutritional needs with  "pTPN.      S/p PICC  - placed on 1/25 - removed on 2/17 when found to have a crack; New one placed on 2/17 for nutrition - found to have superficial venous thrombosis of the left greater saphenous vein about the PICC from the mid calf to the groin. Difficulty with removing and surgery involved - removed on 2/21/18. A new one was placed on 2/19 was removed on 2018 due to concerns for local induration.     FEN:    Vitals:    03/07/18 1600 03/08/18 2000 03/09/18 1600   Weight: 3.63 kg (8 lb) 3.67 kg (8 lb 1.5 oz) 3.7 kg (8 lb 2.5 oz)     I/O: adequate; UOP; stooling (min).    Malnutrition. Normovolemic. Normoglycemic.     147ml/kg/d; 103ml/kg/d    - TF goal to 140 ml/kg/day  - Feeds restarted at 5ml/hr 3/5. Well tolerated, occasional small spit-ups. Stooling more frequently than previously. XR continues to have dilated loops. Advance feeds to 8ml/hr and monitor closely for tolerance. If she tolerates at this level, could consider advance to 9ml/hr 3/11, and continue to advance daily. May need to slow down if having more emesis.  - Surgery following, appreciate recommendations  - Continuing multiple daily rectal irrigations. Surgery would like us to use higher volumes and place the tube higher to irrigate more of the bowel in hopes of preventing future \"backups\".   - Full TPN/SMOF lipids  - Weekly TPN labs and dB. Stable labs 3/2. Next 3/9.  - Monitor fluid status   - Need to obtain trace metal levels, unable to obtain with 3/8 blood draw; try again with next lab draw 3/11.   - Follow TPN labs, I/Os, daily weights      Previous feeding plan:  --Advancing daily if no emesis but will continue to discuss daily plan with surgery.   --Had been up to 7ml/hr.  --Can attempt dry breastfeeding   --Continue PO trials 2x day- can increase if tolerates well    GI:  Gastroschisis- closed 1/27. Surgeon [place]. Possible microcolon on initial presentation. Contrast enema on 2/1 with mildly narrowed transverse colon (unused " appearance), and cecum in the mid right abdomen, otherwise normal contrast enema. LGI with stool plugs in distal small bowel/prox colon.  Upper GI 2/13 non-obstructive. Contrast enema 3/5 with stool plugs.     Respiratory:  Respiratory failure requiring mechanical ventilation 21% supplemental oxygen. Extubated 1/27 and weaned off CPAP on 1/29.   - Currently stable on RA. No desats.   - Monitor respiratory status.     Cardiovascular:    Stable - good perfusion and BP.  No murmur present.    ID:  Potential for sepsis due to the need for manipulation of PICC with clot. On Vanco and Gent. CRP 3.5 on 2018.    Hematology:   > Risk for anemia of prematurity/phlebotomy.  Monitor intermittently.     Hemoglobin   Date Value Ref Range Status   2018 10.7 (L) 11.1 - 19.6 g/dL Final       Superficial venous thrombosis of the left greater saphenous vein about the PICC from the mid calf to the groin noted on ultrasound on 2/19. Difficulty with the removal of the PICC. Ped Surgery involved. Started on IV ibuprofen to reduce inflammation at site. PICC was removed on 2/21/18.Some induration at the site of left upper limb PICC noted. That PICC was removed on 2018.    - Consider f/u U/S of leg ~3/20    Jaundice:  Maternal blood type O+ and pt blood type B+. ELIZA negative.Initial physiologic resolved. Monitor direct bili while on TPN.     Recent Labs   Lab Test  03/08/18   2035  03/01/18   2049  02/23/18   1011  02/16/18   0539  02/12/18   0340   BILITOTAL  4.0*  4.4*  4.3*  2.9  2.2   DBIL  3.2*  3.7*  3.5*  2.3*  1.6*       Worsening direct hyperbilirubiemia.  Direct 1.6 (2/12).  Stable 3.7, improved to 3.2 3/8.   - Continue with SMOF lipids.   - Started on Actigall on 2018. Restarted 3/6 with advancing feeding volumes. Continue to monitor for feeding tolerance.  - Will monitor.    CNS:  Exam wnl. Initial OFC at ~30%ile.    - Monitor clinical status.    Toxicology: Mother with no risk factors for substance abuse.  -  "No need to send urine and meconium toxicology screen at this time.    Sedation/ Pain Control:  - Tylenol prn    Thermoregulation:   - Monitor temperature and provide thermal support as indicated.    H[place]:  [place] 24 hours [place]  - Obtain hearing (normal)/CCHD (passed)/carseat screens PTD.  - Input from OT.  - Continue standard NICU cares and family education plan.    Immunizations     Immunization History   Administered Date(s) Administered     Hep B, Peds or Adolescent 2018        Medications   Current Facility-Administered Medications   Medication     parenteral nutrition -  compounded formula     [START ON 2018] lipids 4 oil (SMOFLIPID) 20% for neonates (Daily dose divided into 2 doses - each infused over 10 hours)     parenteral nutrition -  compounded formula     lipids 4 oil (SMOFLIPID) 20% for neonates (Daily dose divided into 2 doses - each infused over 10 hours)     ursodiol (ACTIGALL) suspension 20 mg     sodium chloride (PF) 0.9% PF flush 1 mL     sodium chloride 0.9% (bottle) irrigation 25 mL     sucrose (SWEET-EASE) solution 0.1-2 mL     glycerin (PEDI-LAX) Suppository 0.25 suppository     breast milk for bar code scanning verification 1 Bottle        Physical Exam    BP 91/56  Temp 98.3  F (36.8  C) (Axillary)  Resp 52  Ht 0.495 m (1' 7.49\")  Wt 3.7 kg (8 lb 2.5 oz)  HC 35.5 cm (13.98\")  SpO2 100%  BMI 15.1 kg/m2   GENERAL: Not in distress. RESPIRATORY: Normal breath sounds bilaterally. CVS: Normal heart tones. No murmur. ABDOMEN: Soft and not distended, bowel sounds normal. CNS: Ant fontanel level. Tone normal for gestational age. Left lower limb, h/o of swelling - resolved  .    Communications   Parents:  Updated by the team after rounds.    PCPs:   Infant PCP: Carolyn Colvin at [facility].   Maternal OB PCP: Maria Del Rosario KELSEYM: Ying Adame  Delivering Provider: Arpita Azevedo  Updated by Epic (all three) 18; ; 3/2; " 3/9    Health Care Team:  Patient discussed with the care team. A/P, imaging studies, laboratory data, medications and family situation reviewed. Care conference with family 2/27 with neonatology and surgery to discuss feeding plans and potential need for Broviac catheter placement (see care conference note).     Attending Neonatologist:  This patient has been seen and evaluated by me, Kaur Brown MD      05-Nov-2022

## 2022-11-05 NOTE — SWALLOW BEDSIDE ASSESSMENT ADULT - SWALLOW EVAL: DIAGNOSIS
+ toleration observed without overt symptoms of penetration/aspiration for puree, soft bitesize and thins

## 2022-11-05 NOTE — SWALLOW BEDSIDE ASSESSMENT ADULT - SLP PERTINENT HISTORY OF CURRENT PROBLEM
87 years old Female with PMHx of HTN, Afib (on cardizem for rate control not on A/C only on heparin sc as of 3 weeks ago) NHL now in remission, COPD (was not on home oxygen until 3 weeks ago 2 L) who presents with the chief complaint of altered mental status. Pt at her baseline talks and is AAO x3 but according to the daughters she stopped talking two days ago. Hx goes back to 3 weeks ago when she was admitted to [facility] at [place] for covid and was started on supplemental Oxygen and steroid taper. Pt recovered well but was retaining urine so a Henry was placed at that time and pt was sent to [facility]. Pt was doing fine until 2 days ago when she stopped talking at all.

## 2022-11-06 LAB
ALBUMIN SERPL ELPH-MCNC: 3.3 G/DL — LOW (ref 3.5–5.2)
ALP SERPL-CCNC: 67 U/L — SIGNIFICANT CHANGE UP (ref 30–115)
ALT FLD-CCNC: 30 U/L — SIGNIFICANT CHANGE UP (ref 0–41)
ANION GAP SERPL CALC-SCNC: 10 MMOL/L — SIGNIFICANT CHANGE UP (ref 7–14)
AST SERPL-CCNC: 22 U/L — SIGNIFICANT CHANGE UP (ref 0–41)
BASOPHILS # BLD AUTO: 0.02 K/UL — SIGNIFICANT CHANGE UP (ref 0–0.2)
BASOPHILS NFR BLD AUTO: 0.4 % — SIGNIFICANT CHANGE UP (ref 0–1)
BILIRUB SERPL-MCNC: 0.3 MG/DL — SIGNIFICANT CHANGE UP (ref 0.2–1.2)
BUN SERPL-MCNC: 15 MG/DL — SIGNIFICANT CHANGE UP (ref 10–20)
CALCIUM SERPL-MCNC: 8.2 MG/DL — LOW (ref 8.4–10.5)
CHLORIDE SERPL-SCNC: 95 MMOL/L — LOW (ref 98–110)
CO2 SERPL-SCNC: 34 MMOL/L — HIGH (ref 17–32)
CREAT SERPL-MCNC: 0.5 MG/DL — LOW (ref 0.7–1.5)
CULTURE RESULTS: SIGNIFICANT CHANGE UP
EGFR: 91 ML/MIN/1.73M2 — SIGNIFICANT CHANGE UP
EOSINOPHIL # BLD AUTO: 0.03 K/UL — SIGNIFICANT CHANGE UP (ref 0–0.7)
EOSINOPHIL NFR BLD AUTO: 0.6 % — SIGNIFICANT CHANGE UP (ref 0–8)
GLUCOSE SERPL-MCNC: 112 MG/DL — HIGH (ref 70–99)
HCT VFR BLD CALC: 28.2 % — LOW (ref 37–47)
HGB BLD-MCNC: 9.7 G/DL — LOW (ref 12–16)
IMM GRANULOCYTES NFR BLD AUTO: 15.1 % — HIGH (ref 0.1–0.3)
LYMPHOCYTES # BLD AUTO: 1.03 K/UL — LOW (ref 1.2–3.4)
LYMPHOCYTES # BLD AUTO: 21.4 % — SIGNIFICANT CHANGE UP (ref 20.5–51.1)
MAGNESIUM SERPL-MCNC: 2 MG/DL — SIGNIFICANT CHANGE UP (ref 1.8–2.4)
MCHC RBC-ENTMCNC: 33.7 PG — HIGH (ref 27–31)
MCHC RBC-ENTMCNC: 34.4 G/DL — SIGNIFICANT CHANGE UP (ref 32–37)
MCV RBC AUTO: 97.9 FL — SIGNIFICANT CHANGE UP (ref 81–99)
MONOCYTES # BLD AUTO: 0.53 K/UL — SIGNIFICANT CHANGE UP (ref 0.1–0.6)
MONOCYTES NFR BLD AUTO: 11 % — HIGH (ref 1.7–9.3)
NEUTROPHILS # BLD AUTO: 2.48 K/UL — SIGNIFICANT CHANGE UP (ref 1.4–6.5)
NEUTROPHILS NFR BLD AUTO: 51.5 % — SIGNIFICANT CHANGE UP (ref 42.2–75.2)
NRBC # BLD: 0 /100 WBCS — SIGNIFICANT CHANGE UP (ref 0–0)
PLATELET # BLD AUTO: 208 K/UL — SIGNIFICANT CHANGE UP (ref 130–400)
POTASSIUM SERPL-MCNC: 3.1 MMOL/L — LOW (ref 3.5–5)
POTASSIUM SERPL-SCNC: 3.1 MMOL/L — LOW (ref 3.5–5)
PROT SERPL-MCNC: 5.1 G/DL — LOW (ref 6–8)
RBC # BLD: 2.88 M/UL — LOW (ref 4.2–5.4)
RBC # FLD: 16.2 % — HIGH (ref 11.5–14.5)
SODIUM SERPL-SCNC: 139 MMOL/L — SIGNIFICANT CHANGE UP (ref 135–146)
SPECIMEN SOURCE: SIGNIFICANT CHANGE UP
WBC # BLD: 4.82 K/UL — SIGNIFICANT CHANGE UP (ref 4.8–10.8)
WBC # FLD AUTO: 4.82 K/UL — SIGNIFICANT CHANGE UP (ref 4.8–10.8)

## 2022-11-06 PROCEDURE — 99233 SBSQ HOSP IP/OBS HIGH 50: CPT

## 2022-11-06 PROCEDURE — 99232 SBSQ HOSP IP/OBS MODERATE 35: CPT

## 2022-11-06 PROCEDURE — 99497 ADVNCD CARE PLAN 30 MIN: CPT | Mod: 25

## 2022-11-06 RX ORDER — POTASSIUM CHLORIDE 20 MEQ
20 PACKET (EA) ORAL
Refills: 0 | Status: DISCONTINUED | OUTPATIENT
Start: 2022-11-06 | End: 2022-11-06

## 2022-11-06 RX ORDER — POTASSIUM CHLORIDE 20 MEQ
40 PACKET (EA) ORAL ONCE
Refills: 0 | Status: DISCONTINUED | OUTPATIENT
Start: 2022-11-06 | End: 2022-11-07

## 2022-11-06 RX ADMIN — Medication 20 MILLIGRAM(S): at 12:32

## 2022-11-06 RX ADMIN — Medication 360 MILLIGRAM(S): at 07:11

## 2022-11-06 RX ADMIN — Medication 1000 MILLIGRAM(S): at 12:32

## 2022-11-06 RX ADMIN — HEPARIN SODIUM 5000 UNIT(S): 5000 INJECTION INTRAVENOUS; SUBCUTANEOUS at 06:35

## 2022-11-06 RX ADMIN — HEPARIN SODIUM 5000 UNIT(S): 5000 INJECTION INTRAVENOUS; SUBCUTANEOUS at 15:08

## 2022-11-06 RX ADMIN — Medication 0.5 MILLIGRAM(S): at 21:00

## 2022-11-06 RX ADMIN — Medication 20 MILLIGRAM(S): at 07:11

## 2022-11-06 RX ADMIN — Medication 40 MILLIGRAM(S): at 06:36

## 2022-11-06 RX ADMIN — HEPARIN SODIUM 5000 UNIT(S): 5000 INJECTION INTRAVENOUS; SUBCUTANEOUS at 21:46

## 2022-11-06 RX ADMIN — AZITHROMYCIN 255 MILLIGRAM(S): 500 TABLET, FILM COATED ORAL at 20:15

## 2022-11-06 RX ADMIN — PANTOPRAZOLE SODIUM 40 MILLIGRAM(S): 20 TABLET, DELAYED RELEASE ORAL at 06:35

## 2022-11-06 RX ADMIN — CEFTRIAXONE 100 MILLIGRAM(S): 500 INJECTION, POWDER, FOR SOLUTION INTRAMUSCULAR; INTRAVENOUS at 17:42

## 2022-11-06 RX ADMIN — Medication 12.5 MILLIGRAM(S): at 07:10

## 2022-11-06 RX ADMIN — PREGABALIN 1000 MICROGRAM(S): 225 CAPSULE ORAL at 12:32

## 2022-11-06 RX ADMIN — Medication 12.5 MILLIGRAM(S): at 15:08

## 2022-11-06 NOTE — CONSULT NOTE ADULT - SUBJECTIVE AND OBJECTIVE BOX
TRICIA DONIS  87y, Female  Allergy: No Known Allergies      CHIEF COMPLAINT: AMS (2022 10:47)      LOS  2d    HPI:  87 years old Female with PMHx of HTN, Afib (on cardizem for rate control not on A/C only on heparin sc as of 3 weeks ago) NHL now in remission, COPD (was not on home oxygen until 3 weeks ago 2 L) who presents with the chief complaint of altered mental status. Pt at her baseline talks and is AAO x3 but according to the daughters she stopped talking two days ago. Hx goes back to 3 weeks ago when she was admitted to [facility] at [place] for covid and was started on supplemental Oxygen and steroid taper. Pt recovered well but was retaining urine so a Henry was placed at that time and pt was sent to [facility]. Pt was doing fine until 2 days ago when she stopped talking at all.    In the Ed pt VS: T 97.8, HR 80, /67 and oxygen sat 93% on 3 L  Labs showed WBC 5.08, hg 10.1, Bicarb 35,   UA was positive on admission.  Pt is admitted for further w/u and managemnt (2022 19:04)      INFECTIOUS DISEASE HISTORY:  History as above.    PAST MEDICAL & SURGICAL HISTORY:  Afib      HTN (hypertension)      NHL (non-Hodgkin&#x27;s lymphoma)          FAMILY HISTORY  FH: type 2 diabetes (Sibling)        SOCIAL HISTORY  Social History:        ROS  General: Denies rigors, nightsweats  HEENT: Denies headache, rhinorrhea, sore throat, eye pain  CV: Denies CP, palpitations  PULM: Denies wheezing, hemoptysis  GI: Denies hematemesis, hematochezia, melena  : Denies discharge, hematuria  MSK: Denies arthralgias, myalgias  SKIN: Denies rash, lesions  NEURO: Denies paresthesias, weakness  PSYCH: Denies depression, anxiety    VITALS:  T(F): 98.4, Max: 99.6 (22 @ 00:34)  HR: 83  BP: 138/63  RR: 18Vital Signs Last 24 Hrs  T(C): 36.9 (2022 05:03), Max: 37.6 (2022 00:34)  T(F): 98.4 (2022 05:03), Max: 99.6 (2022 00:34)  HR: 83 (2022 05:03) (83 - 93)  BP: 138/63 (2022 05:03) (138/63 - 184/79)  BP(mean): --  RR: 18 (2022 05:03) (18 - 20)  SpO2: 97% (2022 05:03) (97% - 98%)    Parameters below as of 2022 05:03  Patient On (Oxygen Delivery Method): nasal cannula  O2 Flow (L/min): 3      PHYSICAL EXAM:  Gen: NAD, resting in bed  HEENT: Normocephalic, atraumatic  Neck: supple, no lymphadenopathy  CV: Regular rate & regular rhythm  Lungs: decreased BS at bases, no fremitus  Abdomen: Soft, BS present  Ext: Warm, well perfused  Neuro: non focal, awake  Skin: no rash, no erythema  Lines: no phlebitis    TESTS & MEASUREMENTS:                        9.7    4.82  )-----------( 208      ( 2022 08:23 )             28.2     11-06    139  |  95<L>  |  15  ----------------------------<  112<H>  3.1<L>   |  34<H>  |  0.5<L>    Ca    8.2<L>      2022 08:23  Mg     2.0     11-    TPro  5.1<L>  /  Alb  3.3<L>  /  TBili  0.3  /  DBili  x   /  AST  22  /  ALT  30  /  AlkPhos  67  11-06      LIVER FUNCTIONS - ( 2022 08:23 )  Alb: 3.3 g/dL / Pro: 5.1 g/dL / ALK PHOS: 67 U/L / ALT: 30 U/L / AST: 22 U/L / GGT: x           Urinalysis Basic - ( 2022 16:27 )    Color: Yellow / Appearance: Slightly Turbid / S.019 / pH: x  Gluc: x / Ketone: Negative  / Bili: Negative / Urobili: <2 mg/dL   Blood: x / Protein: 30 mg/dL / Nitrite: Positive   Leuk Esterase: Large / RBC: 21 /HPF /  /HPF   Sq Epi: x / Non Sq Epi: 0 /HPF / Bacteria: Negative              INFECTIOUS DISEASES TESTING  COVID-19 PCR: Detected (22 @ 13:00)      RADIOLOGY & ADDITIONAL TESTS:  I have personally reviewed the last Chest xray  CXR  Xray Chest 1 View- PORTABLE-Urgent:   ACC: [id number] EXAM:  XR CHEST PORTABLE URGENT 1V                          PROCEDURE DATE:  2022          INTERPRETATION:  CLINICAL HISTORY / REASON FOR EXAM: Cough.    COMPARISON: None.    TECHNIQUE/POSITIONING: Satisfactory. Single image, APportable chest   radiograph.    FINDINGS:    SUPPORT DEVICES: None.    CARDIAC/MEDIASTINUM/HILUM: Unremarkable cardiac silhouette.    LUNG PARENCHYMA/PLEURA: Bilateral lower lobe opacities. Bilateral   parenchymal opacities. No pneumothorax.    SKELETON/SOFT TISSUES: Unremarkable.      IMPRESSION:    Bilateral parenchymal and lower lobe opacities.    --- End of Report ---            JENNA BRANNON MD; Attending Radiologist  This document has been electronically signed. 2022 11:10AM (22 @ 16:23)      CT      CARDIOLOGY TESTING      MEDICATIONS  azithromycin  IVPB     azithromycin  IVPB 500 IV Intermittent every 24 hours  budesonide  80 MICROgram(s)/formoterol 4.5 MICROgram(s) Inhaler 1 Inhalation two times a day  cefTRIAXone   IVPB 1000 IV Intermittent every 24 hours  cyanocobalamin 1000 Oral daily  dicyclomine 20 Oral three times a day before meals  diltiazem    Oral daily  furosemide    Tablet 40 Oral daily  heparin   Injectable 5000 SubCutaneous every 8 hours  ipratropium    for Nebulization 500 Nebulizer every 6 hours  meclizine 12.5 Oral three times a day  melatonin 3 Oral at bedtime  pantoprazole    Tablet 40 Oral before breakfast  potassium chloride    Tablet ER 20 Oral every 2 hours  predniSONE   Tablet 40 Oral daily  predniSONE   Tablet  Oral   simvastatin 10 Oral at bedtime  sucralfate Oral Liquid - Peds 1000 Oral two times a day before meals      Weight  Weight (kg): 55 (22 @ 00:34)    ANTIBIOTICS:  azithromycin  IVPB      azithromycin  IVPB 500 milliGRAM(s) IV Intermittent every 24 hours  cefTRIAXone   IVPB 1000 milliGRAM(s) IV Intermittent every 24 hours      ALLERGIES:  No Known Allergies       TRICIA DONIS  87y, Female  Allergy: No Known Allergies      CHIEF COMPLAINT: AMS (2022 10:47)      LOS  2d    HPI:  87 years old Female with PMHx of HTN, Afib (on cardizem for rate control not on A/C only on heparin sc as of 3 weeks ago) NHL now in remission, COPD (was not on home oxygen until 3 weeks ago 2 L) who presents with the chief complaint of altered mental status. Pt at her baseline talks and is AAO x3 but according to the daughters she stopped talking two days ago. Hx goes back to 3 weeks ago when she was admitted to [facility] at [place] for covid and was started on supplemental Oxygen and steroid taper. Pt recovered well but was retaining urine so a Henry was placed at that time and pt was sent to [facility]. Pt was doing fine until 2 days ago when she stopped talking at all.    In the Ed pt VS: T 97.8, HR 80, /67 and oxygen sat 93% on 3 L  Labs showed WBC 5.08, hg 10.1, Bicarb 35,   UA was positive on admission.  Pt is admitted for further w/u and managemnt (2022 19:04)      INFECTIOUS DISEASE HISTORY:  History as above.  Limited historian.  Oriented to place, but cannot give year.   Minimally verbal.     PAST MEDICAL & SURGICAL HISTORY:  Afib      HTN (hypertension)      NHL (non-Hodgkin&#x27;s lymphoma)          FAMILY HISTORY  FH: type 2 diabetes (Sibling)        SOCIAL HISTORY  Social History:        ROS  unable to obtain history secondary to patient's mental status and/or sedation      VITALS:  T(F): 98.4, Max: 99.6 (22 @ 00:34)  HR: 83  BP: 138/63  RR: 18Vital Signs Last 24 Hrs  T(C): 36.9 (2022 05:03), Max: 37.6 (2022 00:34)  T(F): 98.4 (2022 05:03), Max: 99.6 (2022 00:34)  HR: 83 (2022 05:03) (83 - 93)  BP: 138/63 (2022 05:03) (138/63 - 184/79)  BP(mean): --  RR: 18 (2022 05:03) (18 - 20)  SpO2: 97% (2022 05:03) (97% - 98%)    Parameters below as of 2022 05:03  Patient On (Oxygen Delivery Method): nasal cannula  O2 Flow (L/min): 3      PHYSICAL EXAM:  Gen: NAD, resting in bed  HEENT: Normocephalic, atraumatic  Neck: supple, no lymphadenopathy  CV: Regular rate & regular rhythm  Lungs: decreased BS at bases, no fremitus  Abdomen: Soft, BS present  Ext: Warm, well perfused  Neuro: non focal, awake  Skin: no rash, no erythema  Lines: no phlebitis    TESTS & MEASUREMENTS:                        9.7    4.82  )-----------( 208      ( 2022 08:23 )             28.2     11-06    139  |  95<L>  |  15  ----------------------------<  112<H>  3.1<L>   |  34<H>  |  0.5<L>    Ca    8.2<L>      2022 08:23  Mg     2.0     11-    TPro  5.1<L>  /  Alb  3.3<L>  /  TBili  0.3  /  DBili  x   /  AST  22  /  ALT  30  /  AlkPhos  67  11-06      LIVER FUNCTIONS - ( 2022 08:23 )  Alb: 3.3 g/dL / Pro: 5.1 g/dL / ALK PHOS: 67 U/L / ALT: 30 U/L / AST: 22 U/L / GGT: x           Urinalysis Basic - ( 2022 16:27 )    Color: Yellow / Appearance: Slightly Turbid / S.019 / pH: x  Gluc: x / Ketone: Negative  / Bili: Negative / Urobili: <2 mg/dL   Blood: x / Protein: 30 mg/dL / Nitrite: Positive   Leuk Esterase: Large / RBC: 21 /HPF /  /HPF   Sq Epi: x / Non Sq Epi: 0 /HPF / Bacteria: Negative              INFECTIOUS DISEASES TESTING  COVID-19 PCR: Detected (22 @ 13:00)      RADIOLOGY & ADDITIONAL TESTS:  I have personally reviewed the last Chest xray  CXR  Xray Chest 1 View- PORTABLE-Urgent:   ACC: [id number] EXAM:  XR CHEST PORTABLE URGENT 1V                          PROCEDURE DATE:  2022          INTERPRETATION:  CLINICAL HISTORY / REASON FOR EXAM: Cough.    COMPARISON: None.    TECHNIQUE/POSITIONING: Satisfactory. Single image, APportable chest   radiograph.    FINDINGS:    SUPPORT DEVICES: None.    CARDIAC/MEDIASTINUM/HILUM: Unremarkable cardiac silhouette.    LUNG PARENCHYMA/PLEURA: Bilateral lower lobe opacities. Bilateral   parenchymal opacities. No pneumothorax.    SKELETON/SOFT TISSUES: Unremarkable.      IMPRESSION:    Bilateral parenchymal and lower lobe opacities.    --- End of Report ---            JENNA BRANNON MD; Attending Radiologist  This document has been electronically signed. 2022 11:10AM (22 @ 16:23)      CT      CARDIOLOGY TESTING      MEDICATIONS  azithromycin  IVPB     azithromycin  IVPB 500 IV Intermittent every 24 hours  budesonide  80 MICROgram(s)/formoterol 4.5 MICROgram(s) Inhaler 1 Inhalation two times a day  cefTRIAXone   IVPB 1000 IV Intermittent every 24 hours  cyanocobalamin 1000 Oral daily  dicyclomine 20 Oral three times a day before meals  diltiazem    Oral daily  furosemide    Tablet 40 Oral daily  heparin   Injectable 5000 SubCutaneous every 8 hours  ipratropium    for Nebulization 500 Nebulizer every 6 hours  meclizine 12.5 Oral three times a day  melatonin 3 Oral at bedtime  pantoprazole    Tablet 40 Oral before breakfast  potassium chloride    Tablet ER 20 Oral every 2 hours  predniSONE   Tablet 40 Oral daily  predniSONE   Tablet  Oral   simvastatin 10 Oral at bedtime  sucralfate Oral Liquid - Peds 1000 Oral two times a day before meals      Weight  Weight (kg): 55 (22 @ 00:34)    ANTIBIOTICS:  azithromycin  IVPB      azithromycin  IVPB 500 milliGRAM(s) IV Intermittent every 24 hours  cefTRIAXone   IVPB 1000 milliGRAM(s) IV Intermittent every 24 hours      ALLERGIES:  No Known Allergies

## 2022-11-06 NOTE — PHYSICAL THERAPY INITIAL EVALUATION ADULT - PERTINENT HX OF CURRENT PROBLEM, REHAB EVAL
87 years old Female with PMHx of HTN, Afib (on cardizem for rate control not on A/C only on heparin sc as of 3 weeks ago) NHL now in remission, COPD (was not on home oxygen until 3 weeks ago 2 L) who presents with the chief complaint of altered mental status. Pt at her baseline talks and is AAO x3 but according to the daughters she stopped talking two days ago. Hx goes back to 3 weeks ago when she was admitted to [facility] at [place] for covid and was started on supplemental Oxygen and steroid taper. Pt recovered well but was retaining urine so a Henry was placed at that time and pt was sent to [facility]. Pt was doing fine until 2 days ago when she stopped talking at all.

## 2022-11-06 NOTE — PHYSICAL THERAPY INITIAL EVALUATION ADULT - ADDITIONAL COMMENTS
Unable to assess social history and PLOF. Anayeli is a poor historian, minimally verbal. Not answering questions

## 2022-11-06 NOTE — CONSULT NOTE ADULT - ASSESSMENT
ASSESSMENT  87 years old Female with PMHx of HTN, Afib (on cardizem for rate control not on A/C only on heparin sc as of 3 weeks ago) NHL now in remission, COPD (was not on home oxygen until 3 weeks ago 2 L) who presents with the chief complaint of altered mental status    IMPRESSION  #COVID-19, severe  #NHL in remission  #COPD - on 2L NC at home  #Atrial Fibrillation   #Abx allergy: NKDA    RECOMMENDATIONS  This is a preliminary incomplete pended note, all final recommendations to follow after interview and examination of the patient.    Please call or message on [employer] Teams if with any questions.  Spectra [phone]     ASSESSMENT  87 years old Female with PMHx of HTN, Afib (on cardizem for rate control not on A/C only on heparin sc as of 3 weeks ago) NHL now in remission, COPD (was not on home oxygen until 3 weeks ago 2 L) who presents with the chief complaint of altered mental status    IMPRESSION  #COVID-19, on supplemental O2  #Pyuria  #Metabolic Encephalopathy     #NHL in remission  #COPD - on 2L NC at home  #Atrial Fibrillation   #Abx allergy: NKDA    RECOMMENDATIONS  - unclear onset of symptoms, but can give RDV for 5 day course   - continue ceftriaxone 1g daily   - can stop azithromycin - low suspicion for bacterial pneumonia  - follow-up urine Cx    Please call or message on Microsoft Teams if with any questions.  Spectra [phone]

## 2022-11-07 LAB
ALBUMIN SERPL ELPH-MCNC: 3.1 G/DL — LOW (ref 3.5–5.2)
ALBUMIN SERPL ELPH-MCNC: 3.3 G/DL — LOW (ref 3.5–5.2)
ALP SERPL-CCNC: 72 U/L — SIGNIFICANT CHANGE UP (ref 30–115)
ALP SERPL-CCNC: 72 U/L — SIGNIFICANT CHANGE UP (ref 30–115)
ALT FLD-CCNC: 25 U/L — SIGNIFICANT CHANGE UP (ref 0–41)
ALT FLD-CCNC: 28 U/L — SIGNIFICANT CHANGE UP (ref 0–41)
ANION GAP SERPL CALC-SCNC: 15 MMOL/L — HIGH (ref 7–14)
AST SERPL-CCNC: 16 U/L — SIGNIFICANT CHANGE UP (ref 0–41)
AST SERPL-CCNC: 20 U/L — SIGNIFICANT CHANGE UP (ref 0–41)
BASOPHILS # BLD AUTO: 0.03 K/UL — SIGNIFICANT CHANGE UP (ref 0–0.2)
BASOPHILS NFR BLD AUTO: 0.6 % — SIGNIFICANT CHANGE UP (ref 0–1)
BILIRUB DIRECT SERPL-MCNC: <0.2 MG/DL — SIGNIFICANT CHANGE UP (ref 0–0.3)
BILIRUB INDIRECT FLD-MCNC: >0.1 MG/DL — LOW (ref 0.2–1.2)
BILIRUB SERPL-MCNC: 0.3 MG/DL — SIGNIFICANT CHANGE UP (ref 0.2–1.2)
BILIRUB SERPL-MCNC: 0.3 MG/DL — SIGNIFICANT CHANGE UP (ref 0.2–1.2)
BUN SERPL-MCNC: 23 MG/DL — HIGH (ref 10–20)
CALCIUM SERPL-MCNC: 8.6 MG/DL — SIGNIFICANT CHANGE UP (ref 8.4–10.5)
CHLORIDE SERPL-SCNC: 91 MMOL/L — LOW (ref 98–110)
CO2 SERPL-SCNC: 33 MMOL/L — HIGH (ref 17–32)
CREAT SERPL-MCNC: 1 MG/DL — SIGNIFICANT CHANGE UP (ref 0.7–1.5)
CREAT SERPL-MCNC: 1 MG/DL — SIGNIFICANT CHANGE UP (ref 0.7–1.5)
EGFR: 55 ML/MIN/1.73M2 — LOW
EGFR: 55 ML/MIN/1.73M2 — LOW
EOSINOPHIL # BLD AUTO: 0.01 K/UL — SIGNIFICANT CHANGE UP (ref 0–0.7)
EOSINOPHIL NFR BLD AUTO: 0.2 % — SIGNIFICANT CHANGE UP (ref 0–8)
GLUCOSE SERPL-MCNC: 123 MG/DL — HIGH (ref 70–99)
HCT VFR BLD CALC: 28.4 % — LOW (ref 37–47)
HGB BLD-MCNC: 9.8 G/DL — LOW (ref 12–16)
IMM GRANULOCYTES NFR BLD AUTO: 10.2 % — HIGH (ref 0.1–0.3)
INR BLD: 0.96 RATIO — SIGNIFICANT CHANGE UP (ref 0.65–1.3)
LYMPHOCYTES # BLD AUTO: 1.17 K/UL — LOW (ref 1.2–3.4)
LYMPHOCYTES # BLD AUTO: 24 % — SIGNIFICANT CHANGE UP (ref 20.5–51.1)
MAGNESIUM SERPL-MCNC: 1.8 MG/DL — SIGNIFICANT CHANGE UP (ref 1.8–2.4)
MCHC RBC-ENTMCNC: 33.4 PG — HIGH (ref 27–31)
MCHC RBC-ENTMCNC: 34.5 G/DL — SIGNIFICANT CHANGE UP (ref 32–37)
MCV RBC AUTO: 96.9 FL — SIGNIFICANT CHANGE UP (ref 81–99)
MONOCYTES # BLD AUTO: 0.46 K/UL — SIGNIFICANT CHANGE UP (ref 0.1–0.6)
MONOCYTES NFR BLD AUTO: 9.4 % — HIGH (ref 1.7–9.3)
NEUTROPHILS # BLD AUTO: 2.71 K/UL — SIGNIFICANT CHANGE UP (ref 1.4–6.5)
NEUTROPHILS NFR BLD AUTO: 55.6 % — SIGNIFICANT CHANGE UP (ref 42.2–75.2)
NRBC # BLD: 0 /100 WBCS — SIGNIFICANT CHANGE UP (ref 0–0)
PLATELET # BLD AUTO: 237 K/UL — SIGNIFICANT CHANGE UP (ref 130–400)
POTASSIUM SERPL-MCNC: 3.3 MMOL/L — LOW (ref 3.5–5)
POTASSIUM SERPL-SCNC: 3.3 MMOL/L — LOW (ref 3.5–5)
PROT SERPL-MCNC: 5.3 G/DL — LOW (ref 6–8)
PROT SERPL-MCNC: 5.5 G/DL — LOW (ref 6–8)
PROTHROM AB SERPL-ACNC: 10.9 SEC — SIGNIFICANT CHANGE UP (ref 9.95–12.87)
RBC # BLD: 2.93 M/UL — LOW (ref 4.2–5.4)
RBC # FLD: 16.3 % — HIGH (ref 11.5–14.5)
SODIUM SERPL-SCNC: 139 MMOL/L — SIGNIFICANT CHANGE UP (ref 135–146)
WBC # BLD: 4.88 K/UL — SIGNIFICANT CHANGE UP (ref 4.8–10.8)
WBC # FLD AUTO: 4.88 K/UL — SIGNIFICANT CHANGE UP (ref 4.8–10.8)

## 2022-11-07 PROCEDURE — 93970 EXTREMITY STUDY: CPT | Mod: 26

## 2022-11-07 PROCEDURE — 99233 SBSQ HOSP IP/OBS HIGH 50: CPT

## 2022-11-07 RX ORDER — POTASSIUM CHLORIDE 20 MEQ
20 PACKET (EA) ORAL ONCE
Refills: 0 | Status: COMPLETED | OUTPATIENT
Start: 2022-11-07 | End: 2022-11-07

## 2022-11-07 RX ORDER — REMDESIVIR 5 MG/ML
200 INJECTION INTRAVENOUS EVERY 24 HOURS
Refills: 0 | Status: COMPLETED | OUTPATIENT
Start: 2022-11-07 | End: 2022-11-07

## 2022-11-07 RX ORDER — REMDESIVIR 5 MG/ML
100 INJECTION INTRAVENOUS EVERY 24 HOURS
Refills: 0 | Status: COMPLETED | OUTPATIENT
Start: 2022-11-08 | End: 2022-11-11

## 2022-11-07 RX ORDER — POTASSIUM CHLORIDE 20 MEQ
20 PACKET (EA) ORAL ONCE
Refills: 0 | Status: DISCONTINUED | OUTPATIENT
Start: 2022-11-07 | End: 2022-11-07

## 2022-11-07 RX ORDER — REMDESIVIR 5 MG/ML
INJECTION INTRAVENOUS
Refills: 0 | Status: COMPLETED | OUTPATIENT
Start: 2022-11-07 | End: 2022-11-11

## 2022-11-07 RX ADMIN — Medication 500 MICROGRAM(S): at 20:27

## 2022-11-07 RX ADMIN — Medication 20 MILLIEQUIVALENT(S): at 18:22

## 2022-11-07 RX ADMIN — Medication 12.5 MILLIGRAM(S): at 05:45

## 2022-11-07 RX ADMIN — HEPARIN SODIUM 5000 UNIT(S): 5000 INJECTION INTRAVENOUS; SUBCUTANEOUS at 12:50

## 2022-11-07 RX ADMIN — Medication 1000 MILLIGRAM(S): at 17:13

## 2022-11-07 RX ADMIN — BUDESONIDE AND FORMOTEROL FUMARATE DIHYDRATE 1 PUFF(S): 160; 4.5 AEROSOL RESPIRATORY (INHALATION) at 10:08

## 2022-11-07 RX ADMIN — Medication 40 MILLIGRAM(S): at 07:10

## 2022-11-07 RX ADMIN — PANTOPRAZOLE SODIUM 40 MILLIGRAM(S): 20 TABLET, DELAYED RELEASE ORAL at 05:45

## 2022-11-07 RX ADMIN — Medication 650 MILLIGRAM(S): at 17:13

## 2022-11-07 RX ADMIN — Medication 40 MILLIGRAM(S): at 05:45

## 2022-11-07 RX ADMIN — BUDESONIDE AND FORMOTEROL FUMARATE DIHYDRATE 1 PUFF(S): 160; 4.5 AEROSOL RESPIRATORY (INHALATION) at 18:45

## 2022-11-07 RX ADMIN — HEPARIN SODIUM 5000 UNIT(S): 5000 INJECTION INTRAVENOUS; SUBCUTANEOUS at 21:40

## 2022-11-07 RX ADMIN — SIMVASTATIN 10 MILLIGRAM(S): 20 TABLET, FILM COATED ORAL at 21:41

## 2022-11-07 RX ADMIN — Medication 20 MILLIGRAM(S): at 07:10

## 2022-11-07 RX ADMIN — Medication 360 MILLIGRAM(S): at 07:10

## 2022-11-07 RX ADMIN — HEPARIN SODIUM 5000 UNIT(S): 5000 INJECTION INTRAVENOUS; SUBCUTANEOUS at 05:46

## 2022-11-07 RX ADMIN — Medication 650 MILLIGRAM(S): at 18:00

## 2022-11-07 RX ADMIN — PREGABALIN 1000 MICROGRAM(S): 225 CAPSULE ORAL at 17:14

## 2022-11-07 RX ADMIN — REMDESIVIR 500 MILLIGRAM(S): 5 INJECTION INTRAVENOUS at 12:48

## 2022-11-07 RX ADMIN — CEFTRIAXONE 100 MILLIGRAM(S): 500 INJECTION, POWDER, FOR SOLUTION INTRAMUSCULAR; INTRAVENOUS at 07:09

## 2022-11-07 RX ADMIN — Medication 12.5 MILLIGRAM(S): at 17:11

## 2022-11-07 RX ADMIN — Medication 1000 MILLIGRAM(S): at 07:10

## 2022-11-07 RX ADMIN — Medication 20 MILLIGRAM(S): at 17:13

## 2022-11-08 DIAGNOSIS — G93.41 METABOLIC ENCEPHALOPATHY: ICD-10-CM

## 2022-11-08 DIAGNOSIS — N39.0 URINARY TRACT INFECTION, SITE NOT SPECIFIED: ICD-10-CM

## 2022-11-08 DIAGNOSIS — Z51.5 ENCOUNTER FOR PALLIATIVE CARE: ICD-10-CM

## 2022-11-08 LAB
ALBUMIN SERPL ELPH-MCNC: 3.1 G/DL — LOW (ref 3.5–5.2)
ALP SERPL-CCNC: 67 U/L — SIGNIFICANT CHANGE UP (ref 30–115)
ALT FLD-CCNC: 24 U/L — SIGNIFICANT CHANGE UP (ref 0–41)
ANION GAP SERPL CALC-SCNC: 18 MMOL/L — HIGH (ref 7–14)
AST SERPL-CCNC: 18 U/L — SIGNIFICANT CHANGE UP (ref 0–41)
BILIRUB DIRECT SERPL-MCNC: <0.2 MG/DL — SIGNIFICANT CHANGE UP (ref 0–0.3)
BILIRUB INDIRECT FLD-MCNC: >0 MG/DL — LOW (ref 0.2–1.2)
CALCIUM SERPL-MCNC: 8.8 MG/DL — SIGNIFICANT CHANGE UP (ref 8.4–10.5)
CHLORIDE SERPL-SCNC: 97 MMOL/L — LOW (ref 98–110)
CO2 SERPL-SCNC: 32 MMOL/L — SIGNIFICANT CHANGE UP (ref 17–32)
GLUCOSE SERPL-MCNC: 107 MG/DL — HIGH (ref 70–99)
HCT VFR BLD CALC: 28.8 % — LOW (ref 37–47)
HGB BLD-MCNC: 9.7 G/DL — LOW (ref 12–16)
INR BLD: 0.93 RATIO — SIGNIFICANT CHANGE UP (ref 0.65–1.3)
MCHC RBC-ENTMCNC: 33.1 PG — HIGH (ref 27–31)
MCHC RBC-ENTMCNC: 33.7 G/DL — SIGNIFICANT CHANGE UP (ref 32–37)
MCV RBC AUTO: 98.3 FL — SIGNIFICANT CHANGE UP (ref 81–99)
NRBC # BLD: 0 /100 WBCS — SIGNIFICANT CHANGE UP (ref 0–0)
PLATELET # BLD AUTO: 254 K/UL — SIGNIFICANT CHANGE UP (ref 130–400)
POTASSIUM SERPL-MCNC: 3.9 MMOL/L — SIGNIFICANT CHANGE UP (ref 3.5–5)
POTASSIUM SERPL-SCNC: 3.9 MMOL/L — SIGNIFICANT CHANGE UP (ref 3.5–5)
PROT SERPL-MCNC: 5.3 G/DL — LOW (ref 6–8)
PROTHROM AB SERPL-ACNC: 10.6 SEC — SIGNIFICANT CHANGE UP (ref 9.95–12.87)
RBC # BLD: 2.93 M/UL — LOW (ref 4.2–5.4)
RBC # FLD: 16 % — HIGH (ref 11.5–14.5)
WBC # BLD: 5.59 K/UL — SIGNIFICANT CHANGE UP (ref 4.8–10.8)
WBC # FLD AUTO: 5.59 K/UL — SIGNIFICANT CHANGE UP (ref 4.8–10.8)

## 2022-11-08 PROCEDURE — 99233 SBSQ HOSP IP/OBS HIGH 50: CPT

## 2022-11-08 PROCEDURE — 99222 1ST HOSP IP/OBS MODERATE 55: CPT

## 2022-11-08 RX ORDER — CEFTRIAXONE 500 MG/1
1000 INJECTION, POWDER, FOR SOLUTION INTRAMUSCULAR; INTRAVENOUS EVERY 24 HOURS
Refills: 0 | Status: DISCONTINUED | OUTPATIENT
Start: 2022-11-08 | End: 2022-11-10

## 2022-11-08 RX ORDER — IPRATROPIUM BROMIDE 0.2 MG/ML
1 SOLUTION, NON-ORAL INHALATION EVERY 6 HOURS
Refills: 0 | Status: DISCONTINUED | OUTPATIENT
Start: 2022-11-08 | End: 2022-11-11

## 2022-11-08 RX ADMIN — PANTOPRAZOLE SODIUM 40 MILLIGRAM(S): 20 TABLET, DELAYED RELEASE ORAL at 05:58

## 2022-11-08 RX ADMIN — CEFTRIAXONE 100 MILLIGRAM(S): 500 INJECTION, POWDER, FOR SOLUTION INTRAMUSCULAR; INTRAVENOUS at 05:49

## 2022-11-08 RX ADMIN — Medication 650 MILLIGRAM(S): at 05:57

## 2022-11-08 RX ADMIN — Medication 1 PUFF(S): at 14:41

## 2022-11-08 RX ADMIN — SIMVASTATIN 10 MILLIGRAM(S): 20 TABLET, FILM COATED ORAL at 21:49

## 2022-11-08 RX ADMIN — HEPARIN SODIUM 5000 UNIT(S): 5000 INJECTION INTRAVENOUS; SUBCUTANEOUS at 21:49

## 2022-11-08 RX ADMIN — Medication 20 MILLIGRAM(S): at 18:53

## 2022-11-08 RX ADMIN — HEPARIN SODIUM 5000 UNIT(S): 5000 INJECTION INTRAVENOUS; SUBCUTANEOUS at 05:55

## 2022-11-08 RX ADMIN — Medication 12.5 MILLIGRAM(S): at 14:21

## 2022-11-08 RX ADMIN — Medication 40 MILLIGRAM(S): at 05:57

## 2022-11-08 RX ADMIN — Medication 1 PUFF(S): at 19:58

## 2022-11-08 RX ADMIN — Medication 20 MILLIGRAM(S): at 05:57

## 2022-11-08 RX ADMIN — Medication 1000 MILLIGRAM(S): at 18:54

## 2022-11-08 RX ADMIN — Medication 40 MILLIGRAM(S): at 05:56

## 2022-11-08 RX ADMIN — CEFTRIAXONE 100 MILLIGRAM(S): 500 INJECTION, POWDER, FOR SOLUTION INTRAMUSCULAR; INTRAVENOUS at 19:16

## 2022-11-08 RX ADMIN — Medication 360 MILLIGRAM(S): at 05:56

## 2022-11-08 RX ADMIN — BUDESONIDE AND FORMOTEROL FUMARATE DIHYDRATE 1 PUFF(S): 160; 4.5 AEROSOL RESPIRATORY (INHALATION) at 21:48

## 2022-11-08 RX ADMIN — HEPARIN SODIUM 5000 UNIT(S): 5000 INJECTION INTRAVENOUS; SUBCUTANEOUS at 14:22

## 2022-11-08 NOTE — CONSULT NOTE ADULT - PROBLEM SELECTOR RECOMMENDATION 3
-Full code confirmed with family 2 days ago  -clinical improvement today  -Palliative care will sign off as goals are clear, but will be available for GOC discussions as appropriate moving forward

## 2022-11-08 NOTE — CONSULT NOTE ADULT - NS ATTEND AMEND GEN_ALL_CORE FT
I wrote the physician and A+P above  Primary team doesn't believe family will be open to palliative care given clinical improvement  Will sign off, but will be available for GOC discussion if needed in future

## 2022-11-08 NOTE — CONSULT NOTE ADULT - PROBLEM SELECTOR RECOMMENDATION 9
In the setting of UTI vs Covid  -treatment of UTI with IV antibiotics  -treatment of COVID with remdesivir  -f/u ID

## 2022-11-08 NOTE — CONSULT NOTE ADULT - ASSESSMENT
87 years old Female with PMHx of HTN, Afib (on cardizem for rate control on heparin sc as of 3 weeks ago) NHL now in remission, COPD (was not on home oxygen until 3 weeks ago 2 L) who presents with the chief complaint of altered mental status. Patient comes from [facility] where she was following recent hospitalization at [facility]. Patient currently being treated for metabolic encephalopathy 2/2 UTI/possible long covid- on remdesivir, abx. Family confirmed full code status on 11/6. Palliative consulted for [facility] given guarded prognosis.       MEDD (morphine equivalent daily dose):      See Recs below.    Please call [phone] with questions or concerns 24/7.   We will continue to follow.     Discussed with primary MD.   87 years old Female with PMHx of HTN, Afib (on cardizem for rate control on heparin sc as of 3 weeks ago) NHL now in remission, COPD (was not on home oxygen until 3 weeks ago 2 L) who presents with the chief complaint of altered mental status. Patient comes from [facility] where she was following recent hospitalization at [place]. Patient currently being treated for metabolic encephalopathy 2/2 UTI/possible long covid- on remdesivir, abx. Family confirmed full code status on 11/6. Palliative consulted for GOC given guarded prognosis.     Spoke with Dr. Obrien this afternoon. Palliative care was consulted for GOC yesterday when patient was clinically declining. Patient showed some clinical improvement today and family is optimistic about her clinical outlook. Given the patient's clinical improvement, Dr. Obrien is unsure if family will be open to palliative involvement at this time.  Thus, at this time, palliative care will sign off, but will be available for GOC discussions as appropriate moving forward.      MEDD (morphine equivalent daily dose): NA      See Recs below.    Please call [phone] with questions or concerns 24/7.   We will sign off.    Discussed with primary MD.

## 2022-11-08 NOTE — CONSULT NOTE ADULT - SUBJECTIVE AND OBJECTIVE BOX
HPI:    87 years old Female with PMHx of HTN, Afib (on cardizem for rate control not on A/C only on heparin sc as of 3 weeks ago) NHL now in remission, COPD (was not on home oxygen until 3 weeks ago 2 L) who presents with the chief complaint of altered mental status. Pt at her baseline talks and is AAO x3 but according to the daughters she stopped talking two days ago. Hx goes back to 3 weeks ago when she was admitted to [facility] at [place] for covid and was started on supplemental Oxygen and steroid taper. Pt recovered well but was retaining urine so a Henry was placed at that time and pt was sent to [facility]. Pt was doing fine until 2 days ago when she stopped talking at all.      PERTINENT PM/SXH:   Afib    HTN (hypertension)    NHL (non-Hodgkin&#x27;s lymphoma)    FAMILY HISTORY:  FH: type 2 diabetes (Sibling)      ITEMS NOT CHECKED ARE NOT PRESENT    SOCIAL HISTORY:   Significant other/partner[ ]  Children[ x]  Jainism/Spirituality:  Substance hx:  [ ]   Tobacco hx:  [ ]   Alcohol hx: [ ]   Living Situation: [ ]Home  [ ]Long term care  [X- Eger ]Rehab [ ]Other  Home Services: [ ] HHA [ ] Eryn RN [ ] Hospice  Occupation:  Home Opioid hx:  [ ] Y [ ] N [ ] I-Stop Reference No:    ADVANCE DIRECTIVES:    MOLST  [ ]  Living Will  [ ]   DECISION MAKER(s):  [ ] Health Care Proxy(s)  [ ] Surrogate(s)  [ ] Guardian           Name(s): Phone Number(s): Chikis Pearson and Maryan Tammi     BASELINE (I)ADL(s) (prior to admission):  [place]: [ ]Total  [ ] Moderate [ ]Dependent  Palliative Performance Status Version 2:         %    http://npcrc.org/files/news/palliative_performance_scale_ppsv2.pdf    Allergies    No Known Allergies    Intolerances    MEDICATIONS  (STANDING):  budesonide  80 MICROgram(s)/formoterol 4.5 MICROgram(s) Inhaler 1 Puff(s) Inhalation two times a day  cefTRIAXone   IVPB 1000 milliGRAM(s) IV Intermittent every 24 hours  cyanocobalamin 1000 MICROGram(s) Oral daily  dicyclomine 20 milliGRAM(s) Oral three times a day before meals  diltiazem    milliGRAM(s) Oral daily  furosemide    Tablet 40 milliGRAM(s) Oral daily  heparin   Injectable 5000 Unit(s) SubCutaneous every 8 hours  ipratropium    for Nebulization 500 MICROGram(s) Nebulizer every 6 hours  meclizine 12.5 milliGRAM(s) Oral three times a day  melatonin 3 milliGRAM(s) Oral at bedtime  pantoprazole    Tablet 40 milliGRAM(s) Oral before breakfast  predniSONE   Tablet 40 milliGRAM(s) Oral daily  predniSONE   Tablet   Oral   remdesivir  IVPB   IV Intermittent   remdesivir  IVPB 100 milliGRAM(s) IV Intermittent every 24 hours  simvastatin 10 milliGRAM(s) Oral at bedtime  sucralfate Oral Liquid - Peds 1000 milliGRAM(s) Oral two times a day before meals    MEDICATIONS  (PRN):  acetaminophen     Tablet .. 650 milliGRAM(s) Oral every 6 hours PRN Temp greater or equal to 38C (100.4F), Mild Pain (1 - 3)  guaiFENesin  milliGRAM(s) Oral every 12 hours PRN Cough  LORazepam     Tablet 0.5 milliGRAM(s) Oral daily PRN Anxiety    PRESENT SYMPTOMS: [ ]Unable to obtain due to poor mentation   Source if other than patient:  [ ]Family   [ ]Team     Pain: [ ]yes [ ]no  QOL impact -   Location -                    Aggravating factors -  Quality -  Radiation -  Timing-  Severity (0-10 scale):  Minimal acceptable level (0-10 scale):     CPOT:    https://www.[facility].org/getattachment/rxq10s81-1r6t-[email or web address]/Critical-Care-Pain-Observation-Tool-(CPOT)      PAIN AD Score:     http://geriatrictoolkit.[place].[facility]/cog/painad.pdf (press ctrl +  left click to view)    Dyspnea:                           [ ]Mild [ ]Moderate [ ]Severe  Anxiety:                             [ ]Mild [ ]Moderate [ ]Severe  Fatigue:                             [ ]Mild [ ]Moderate [ ]Severe  Nausea:                             [ ]Mild [ ]Moderate [ ]Severe  Loss of appetite:              [ ]Mild [ ]Moderate [ ]Severe  Constipation:                    [ ]Mild [ ]Moderate [ ]Severe    Other Symptoms:  [ ]All other review of systems negative     Palliative Performance Status Version 2:         %    http://npcrc.org/files/news/palliative_performance_scale_ppsv2.pdf  PHYSICAL EXAM:  Vital Signs Last 24 Hrs  T(C): 36.4 (08 Nov 2022 04:43), Max: 36.5 (07 Nov 2022 19:59)  T(F): 97.5 (08 Nov 2022 04:43), Max: 97.7 (07 Nov 2022 19:59)  HR: 62 (08 Nov 2022 04:43) (62 - 76)  BP: 131/63 (08 Nov 2022 04:43) (131/63 - 137/78)  BP(mean): --  RR: 18 (08 Nov 2022 04:43) (18 - 18)  SpO2: 99% (08 Nov 2022 04:43) (99% - 99%)    GENERAL:  [ ]Alert  [ ]Oriented x   [ ]Lethargic  [ ]Cachexia  [ ]Unarousable  [ ]Verbal  [ ]Non-Verbal  Behavioral:   [ ] Anxiety  [ ] Delirium [ ] Agitation [ ] Other  HEENT:  [ ]Normal   [ ]Dry mouth   [ ]ET Tube/Trach  [ ]Oral lesions  PULMONARY:   [ ]Clear [ ]Tachypnea  [ ]Audible excessive secretions   [ ]Rhonchi        [ ]Right [ ]Left [ ]Bilateral  [ ]Crackles        [ ]Right [ ]Left [ ]Bilateral  [ ]Wheezing     [ ]Right [ ]Left [ ]Bilateral  [ ]Diminished breath sounds [ ]right [ ]left [ ]bilateral  CARDIOVASCULAR:    [ ]Regular [ ]Irregular [ ]Tachy  [ ]Irwin [ ]Murmur [ ]Other  GASTROINTESTINAL:  [ ]Soft  [ ]Distended   [ ]+BS  [ ]Non tender [ ]Tender  [ ]PEG [ ]OGT/ NGT  Last BM:   GENITOURINARY:  [ ]Normal [ ] Incontinent   [ ]Oliguria/Anuria   [ ]Henry  MUSCULOSKELETAL:   [ ]Normal   [ ]Weakness  [ ]Bed/Wheelchair bound [ ]Edema  NEUROLOGIC:   [ ]No focal deficits  [ ]Cognitive impairment  [ ]Dysphagia [ ]Dysarthria [ ]Paresis [ ]Other   SKIN:   [ ]Normal    [ ]Rash  [ ]Pressure ulcer(s)       Present on admission [ ]y [ ]n      LABS:                        9.7    5.59  )-----------( 254      ( 08 Nov 2022 07:53 )             28.8   11-08    147<H>  |  97<L>  |  24<H>  ----------------------------<  107<H>  3.9   |  32  |  0.8    Ca    8.8      08 Nov 2022 07:53  Mg     1.8     11-07    TPro  5.3<L>  /  Alb  3.1<L>  /  TBili  0.2  /  DBili  <0.2  /  AST  18  /  ALT  24  /  AlkPhos  67  11-08  PT/INR - ( 08 Nov 2022 07:53 )   PT: 10.60 sec;   INR: 0.93 ratio            RADIOLOGY & ADDITIONAL STUDIES:    < from: CT Head No Cont (11.04.22 @ 14:11) >    IMPRESSION:  No evidence of acute intracranial pathology.    Air-fluid levels within the right maxillary and bilateral sphenoid   sinuses, correlate for sinusitis.    Moderate chronic microvascular type changes.    --- End of Report ---      < end of copied text >    < from: Xray Chest 1 View- PORTABLE-Routine (Xray Chest 1 View- PORTABLE-Routine in AM.) (11.05.22 @ 06:48) >      IMPRESSION:    Unchanged bilateral parenchymal and bibasilar opacities.    --- End of Report ---    < end of copied text >    < from: 12 Lead ECG (11.04.22 @ 20:42) >    Ventricular Rate 88 BPM    Atrial Rate 88 BPM    P-R Interval 144 ms    QRS Duration 74 ms    Q-T Interval 350 ms    QTC Calculation(Bazett) 423 ms    P Axis 61 degrees    R Axis 18 degrees    T Axis 9 degrees    Diagnosis Line Sinus rhythm with Premature atrial complexes  Otherwise normal ECG    Confirmed by LUIS FERNANDO CALDERON MD ([id number]) on 11/7/2022 6:49:37 AM    < end of copied text >      REFERRALS:   [ ]Chaplaincy  [ ]Hospice  [ ]Child Life  [ ]Social Work  [ ]Case management [ ]Holistic Therapy     Goals of Care Document:        CC: altered mental status    HPI:    87 years old Female with PMHx of HTN, Afib (on cardizem for rate control not on A/C only on heparin sc as of 3 weeks ago) NHL now in remission, COPD (was not on home oxygen until 3 weeks ago 2 L) who presents with the chief complaint of altered mental status. Pt at her baseline talks and is AAO x3 but according to the daughters she stopped talking two days ago. Hx goes back to 3 weeks ago when she was admitted to [facility] at [place] for covid and was started on supplemental Oxygen and steroid taper. Pt recovered well but was retaining urine so a Henry was placed at that time and pt was sent to [facility]. Pt was doing fine until 2 days ago when she stopped talking at all.      PERTINENT PM/SXH:   Afib    HTN (hypertension)    NHL (non-Hodgkin&#x27;s lymphoma)    FAMILY HISTORY:  FH: type 2 diabetes (Sibling)      ITEMS NOT CHECKED ARE NOT PRESENT    SOCIAL HISTORY:   Significant other/partner[ ]  Children[ x]  Baptism/Spirituality:  Substance hx:  [ ]   Tobacco hx:  [ ]   Alcohol hx: [ ]   Living Situation: [ ]Home  [ ]Long term care  [X- Eger ]Rehab [ ]Other  Home Services: [ ] HHA [ ] Visting RN [ ] Hospice  Occupation:  Home Opioid hx:  [ ] Y [ ] N [x] I-Stop Reference No:  Reference #: [id number]      Others' Prescriptions  Patient Name: Goran PearsonBirth Date: 1935  Address: [address] [place], [place] [id number]: Female  Rx Written	Rx Dispensed	Drug	Quantity	Days Supply	Prescriber Name	Prescriber Yadira #	Payment Method	Dispenser  11/03/2022	11/03/2022	clonazepam 0.5 mg tablet	15	[id number]	Insurance	[employer]    ADVANCE DIRECTIVES:    MOLST  [ ]  Living Will  [ ]   DECISION MAKER(s):  [ ] Health Care Proxy(s)  [x] Surrogate(s)  [ ] Guardian           Name(s): Phone Number(s): Chikis Pearson and Maryan Tammi     BASELINE (I)ADL(s) (prior to admission):  [place]: [ ]Total  [ ] Moderate [ ]Dependent  Palliative Performance Status Version 2:         Unknown%    http://npcrc.org/files/news/palliative_performance_scale_ppsv2.pdf    Allergies    No Known Allergies    Intolerances    MEDICATIONS  (STANDING):  budesonide  80 MICROgram(s)/formoterol 4.5 MICROgram(s) Inhaler 1 Puff(s) Inhalation two times a day  cefTRIAXone   IVPB 1000 milliGRAM(s) IV Intermittent every 24 hours  cyanocobalamin 1000 MICROGram(s) Oral daily  dicyclomine 20 milliGRAM(s) Oral three times a day before meals  diltiazem    milliGRAM(s) Oral daily  furosemide    Tablet 40 milliGRAM(s) Oral daily  heparin   Injectable 5000 Unit(s) SubCutaneous every 8 hours  ipratropium    for Nebulization 500 MICROGram(s) Nebulizer every 6 hours  meclizine 12.5 milliGRAM(s) Oral three times a day  melatonin 3 milliGRAM(s) Oral at bedtime  pantoprazole    Tablet 40 milliGRAM(s) Oral before breakfast  predniSONE   Tablet 40 milliGRAM(s) Oral daily  predniSONE   Tablet   Oral   remdesivir  IVPB   IV Intermittent   remdesivir  IVPB 100 milliGRAM(s) IV Intermittent every 24 hours  simvastatin 10 milliGRAM(s) Oral at bedtime  sucralfate Oral Liquid - Peds 1000 milliGRAM(s) Oral two times a day before meals    MEDICATIONS  (PRN):  acetaminophen     Tablet .. 650 milliGRAM(s) Oral every 6 hours PRN Temp greater or equal to 38C (100.4F), Mild Pain (1 - 3)  guaiFENesin  milliGRAM(s) Oral every 12 hours PRN Cough  LORazepam     Tablet 0.5 milliGRAM(s) Oral daily PRN Anxiety    PRESENT SYMPTOMS: [x ]Unable to obtain due to poor mentation   Source if other than patient:  [ ]Family   [ ]Team     Pain: [x]yes [ ]no - Patient nodded head yes, but did not speak or elaborate nonverbally  QOL impact -   Location -                    Aggravating factors -  Quality -  Radiation -  Timing-  Severity (0-10 scale):  Minimal acceptable level (0-10 scale):     PAIN AD Score: 1    Dyspnea:                           [ ]Mild [ ]Moderate [ ]Severe  Anxiety:                             [ ]Mild [ ]Moderate [ ]Severe  Fatigue:                             [ ]Mild [ ]Moderate [ ]Severe  Nausea:                             [ ]Mild [ ]Moderate [ ]Severe  Loss of appetite:              [ ]Mild [ ]Moderate [ ]Severe  Constipation:                    [ ]Mild [ ]Moderate [ ]Severe    Other Symptoms:  [ ]All other review of systems negative     Palliative Performance Status Version 2:         30%    http://[facility].org/files/news/palliative_performance_scale_ppsv2.pdf    PHYSICAL EXAM:  Vital Signs Last 24 Hrs  T(C): 36.4 (08 Nov 2022 04:43), Max: 36.5 (07 Nov 2022 19:59)  T(F): 97.5 (08 Nov 2022 04:43), Max: 97.7 (07 Nov 2022 19:59)  HR: 62 (08 Nov 2022 04:43) (62 - 76)  BP: 131/63 (08 Nov 2022 04:43) (131/63 - 137/78)  BP(mean): --  RR: 18 (08 Nov 2022 04:43) (18 - 18)  SpO2: 99% (08 Nov 2022 04:43) (99% - 99%)    GENERAL:  [ x]Alert  [x ]Oriented x 1  [ ]Lethargic  [ ]Cachexia  [ ]Unarousable  [ ]Verbal  [ x]Non-Verbal  Behavioral:   [ ] Anxiety  [ ] Delirium [ ] Agitation [ ] Other [x]calm  Eyes: no scleral icterus  ENMT:  [ ]Normal   [ ]Dry mouth   [ ]ET Tube/Trach  [x]No Oral lesions  PULMONARY:   [ ]Clear [ ]Tachypnea  [x ]No Audible excessive secretions   [ ]Rhonchi        [ ]Right [ ]Left [ ]Bilateral  [ ]Crackles        [ ]Right [ ]Left [ ]Bilateral  [ ]Wheezing     [ ]Right [ ]Left [ ]Bilateral  [ ]Diminished breath sounds [ ]right [ ]left [ ]bilateral  CARDIOVASCULAR:    [ ]Regular [ ]Irregular [x ]No tachy  [ ]Irwin [ ]Murmur [ ]Other  GASTROINTESTINAL:  [ ]Soft  [x ]nonDistended   [ ]+BS  [ ]Non tender [ ]Tender  [ ]PEG [ ]OGT/ NGT  Last BM:   GENITOURINARY:  [ ]Normal [ ] Incontinent   [ ]Oliguria/Anuria   [ ]Henry  MUSCULOSKELETAL:   [ ]Normal   [ ]Weakness  [ ]Bed/Wheelchair bound [ ]Edema  NEUROLOGIC:   [ ]No focal deficits  [ x]Cognitive impairment  [ ]Dysphagia [ ]Dysarthria [ ]Paresis [ ]Other   SKIN:   [ ]Normal    [x ]No rash  [ ]Pressure ulcer(s)       Present on admission [ ]y [ ]n      LABS: reviewed                        9.7    5.59  )-----------( 254      ( 08 Nov 2022 07:53 )             28.8   11-08    147<H>  |  97<L>  |  24<H>  ----------------------------<  107<H>  3.9   |  32  |  0.8    Ca    8.8      08 Nov 2022 07:53  Mg     1.8     11-07    TPro  5.3<L>  /  Alb  3.1<L>  /  TBili  0.2  /  DBili  <0.2  /  AST  18  /  ALT  24  /  AlkPhos  67  11-08  PT/INR - ( 08 Nov 2022 07:53 )   PT: 10.60 sec;   INR: 0.93 ratio            RADIOLOGY & ADDITIONAL STUDIES: reviewed    < from: CT Head No Cont (11.04.22 @ 14:11) >    IMPRESSION:  No evidence of acute intracranial pathology.    Air-fluid levels within the right maxillary and bilateral sphenoid   sinuses, correlate for sinusitis.    Moderate chronic microvascular type changes.    --- End of Report ---      < end of copied text >    < from: Xray Chest 1 View- PORTABLE-Routine (Xray Chest 1 View- PORTABLE-Routine in AM.) (11.05.22 @ 06:48) >      IMPRESSION:    Unchanged bilateral parenchymal and bibasilar opacities.        EKG reviewed  Ventricular Rate 88 BPM    Atrial Rate 88 BPM    P-R Interval 144 ms    QRS Duration 74 ms    Q-T Interval 350 ms    QTC Calculation(Bazett) 423 ms    P Axis 61 degrees    R Axis 18 degrees    T Axis 9 degrees    Diagnosis Line Sinus rhythm with Premature atrial complexes  Otherwise normal ECG    Confirmed by LUIS FERNANDO CALDERON MD ([id number]) on 11/7/2022 6:49:37 AM    < end of copied text >      REFERRALS:   [ ]Chaplaincy  [ ]Hospice  [ ]Child Life  [ ]Social Work  [ ]Case management [ ]Holistic Therapy

## 2022-11-09 ENCOUNTER — TRANSCRIPTION ENCOUNTER (OUTPATIENT)
Age: 87
End: 2022-11-09

## 2022-11-09 LAB
ALBUMIN SERPL ELPH-MCNC: 3.3 G/DL — LOW (ref 3.5–5.2)
ALP SERPL-CCNC: 75 U/L — SIGNIFICANT CHANGE UP (ref 30–115)
ALT FLD-CCNC: 23 U/L — SIGNIFICANT CHANGE UP (ref 0–41)
ANION GAP SERPL CALC-SCNC: 13 MMOL/L — SIGNIFICANT CHANGE UP (ref 7–14)
AST SERPL-CCNC: 20 U/L — SIGNIFICANT CHANGE UP (ref 0–41)
BILIRUB DIRECT SERPL-MCNC: <0.2 MG/DL — SIGNIFICANT CHANGE UP (ref 0–0.3)
BILIRUB INDIRECT FLD-MCNC: >0.1 MG/DL — LOW (ref 0.2–1.2)
BILIRUB SERPL-MCNC: 0.3 MG/DL — SIGNIFICANT CHANGE UP (ref 0.2–1.2)
BUN SERPL-MCNC: 21 MG/DL — HIGH (ref 10–20)
CALCIUM SERPL-MCNC: 8.9 MG/DL — SIGNIFICANT CHANGE UP (ref 8.4–10.5)
CHLORIDE SERPL-SCNC: 93 MMOL/L — LOW (ref 98–110)
CO2 SERPL-SCNC: 36 MMOL/L — HIGH (ref 17–32)
CREAT SERPL-MCNC: 0.6 MG/DL — LOW (ref 0.7–1.5)
EGFR: 87 ML/MIN/1.73M2 — SIGNIFICANT CHANGE UP
GLUCOSE SERPL-MCNC: 131 MG/DL — HIGH (ref 70–99)
HCT VFR BLD CALC: 32.9 % — LOW (ref 37–47)
HGB BLD-MCNC: 11.3 G/DL — LOW (ref 12–16)
INR BLD: 1.03 RATIO — SIGNIFICANT CHANGE UP (ref 0.65–1.3)
MAGNESIUM SERPL-MCNC: 1.5 MG/DL — LOW (ref 1.8–2.4)
MCHC RBC-ENTMCNC: 33.7 PG — HIGH (ref 27–31)
MCHC RBC-ENTMCNC: 34.3 G/DL — SIGNIFICANT CHANGE UP (ref 32–37)
MCV RBC AUTO: 98.2 FL — SIGNIFICANT CHANGE UP (ref 81–99)
NRBC # BLD: 0 /100 WBCS — SIGNIFICANT CHANGE UP (ref 0–0)
PLATELET # BLD AUTO: 292 K/UL — SIGNIFICANT CHANGE UP (ref 130–400)
POTASSIUM SERPL-MCNC: 3.4 MMOL/L — LOW (ref 3.5–5)
POTASSIUM SERPL-SCNC: 3.4 MMOL/L — LOW (ref 3.5–5)
PROCALCITONIN SERPL-MCNC: 0.13 NG/ML — HIGH (ref 0.02–0.1)
PROT SERPL-MCNC: 5.4 G/DL — LOW (ref 6–8)
PROTHROM AB SERPL-ACNC: 11.8 SEC — SIGNIFICANT CHANGE UP (ref 9.95–12.87)
RBC # BLD: 3.35 M/UL — LOW (ref 4.2–5.4)
RBC # FLD: 16.6 % — HIGH (ref 11.5–14.5)
SODIUM SERPL-SCNC: 142 MMOL/L — SIGNIFICANT CHANGE UP (ref 135–146)
WBC # BLD: 7.82 K/UL — SIGNIFICANT CHANGE UP (ref 4.8–10.8)
WBC # FLD AUTO: 7.82 K/UL — SIGNIFICANT CHANGE UP (ref 4.8–10.8)

## 2022-11-09 PROCEDURE — 99233 SBSQ HOSP IP/OBS HIGH 50: CPT

## 2022-11-09 RX ORDER — PANTOPRAZOLE SODIUM 20 MG/1
1 TABLET, DELAYED RELEASE ORAL
Qty: 0 | Refills: 0 | DISCHARGE
Start: 2022-11-09

## 2022-11-09 RX ORDER — PREGABALIN 225 MG/1
1 CAPSULE ORAL
Qty: 0 | Refills: 0 | DISCHARGE
Start: 2022-11-09

## 2022-11-09 RX ORDER — MECLIZINE HCL 12.5 MG
1 TABLET ORAL
Qty: 0 | Refills: 0 | DISCHARGE

## 2022-11-09 RX ORDER — DILTIAZEM HCL 120 MG
1 CAPSULE, EXT RELEASE 24 HR ORAL
Qty: 0 | Refills: 0 | DISCHARGE
Start: 2022-11-09

## 2022-11-09 RX ORDER — IPRATROPIUM BROMIDE 0.2 MG/ML
1 SOLUTION, NON-ORAL INHALATION
Qty: 0 | Refills: 0 | DISCHARGE
Start: 2022-11-09

## 2022-11-09 RX ORDER — BACITRACIN ZINC 500 UNIT/G
1 OINTMENT IN PACKET (EA) TOPICAL DAILY
Refills: 0 | Status: DISCONTINUED | OUTPATIENT
Start: 2022-11-09 | End: 2022-11-11

## 2022-11-09 RX ORDER — SIMVASTATIN 20 MG/1
1 TABLET, FILM COATED ORAL
Qty: 0 | Refills: 0 | DISCHARGE
Start: 2022-11-09

## 2022-11-09 RX ORDER — SUCRALFATE 1 G
10 TABLET ORAL
Qty: 0 | Refills: 0 | DISCHARGE

## 2022-11-09 RX ORDER — IPRATROPIUM BROMIDE 21 MCG
2 AEROSOL, SPRAY (ML) NASAL
Qty: 0 | Refills: 0 | DISCHARGE

## 2022-11-09 RX ORDER — SIMVASTATIN 20 MG/1
1 TABLET, FILM COATED ORAL
Qty: 0 | Refills: 0 | DISCHARGE

## 2022-11-09 RX ORDER — LANOLIN ALCOHOL/MO/W.PET/CERES
1 CREAM (GRAM) TOPICAL
Qty: 0 | Refills: 0 | DISCHARGE
Start: 2022-11-09

## 2022-11-09 RX ORDER — LANOLIN ALCOHOL/MO/W.PET/CERES
1 CREAM (GRAM) TOPICAL
Qty: 0 | Refills: 0 | DISCHARGE

## 2022-11-09 RX ORDER — HEPARIN SODIUM 5000 [USP'U]/ML
1 INJECTION INTRAVENOUS; SUBCUTANEOUS
Qty: 0 | Refills: 0 | DISCHARGE

## 2022-11-09 RX ORDER — MECLIZINE HCL 12.5 MG
1 TABLET ORAL
Qty: 0 | Refills: 0 | DISCHARGE
Start: 2022-11-09

## 2022-11-09 RX ORDER — BUDESONIDE AND FORMOTEROL FUMARATE DIHYDRATE 160; 4.5 UG/1; UG/1
1 AEROSOL RESPIRATORY (INHALATION)
Qty: 0 | Refills: 0 | DISCHARGE
Start: 2022-11-09

## 2022-11-09 RX ORDER — ACETAMINOPHEN 500 MG
2 TABLET ORAL
Qty: 0 | Refills: 0 | DISCHARGE
Start: 2022-11-09

## 2022-11-09 RX ORDER — ACETAMINOPHEN 500 MG
2 TABLET ORAL
Qty: 0 | Refills: 0 | DISCHARGE

## 2022-11-09 RX ORDER — SUCRALFATE 1 G
10 TABLET ORAL
Qty: 0 | Refills: 0 | DISCHARGE
Start: 2022-11-09

## 2022-11-09 RX ORDER — PREGABALIN 225 MG/1
1 CAPSULE ORAL
Qty: 0 | Refills: 0 | DISCHARGE

## 2022-11-09 RX ORDER — PANTOPRAZOLE SODIUM 20 MG/1
1 TABLET, DELAYED RELEASE ORAL
Qty: 0 | Refills: 0 | DISCHARGE

## 2022-11-09 RX ORDER — BUDESONIDE AND FORMOTEROL FUMARATE DIHYDRATE 160; 4.5 UG/1; UG/1
1 AEROSOL RESPIRATORY (INHALATION)
Qty: 0 | Refills: 0 | DISCHARGE

## 2022-11-09 RX ORDER — DILTIAZEM HCL 120 MG
1 CAPSULE, EXT RELEASE 24 HR ORAL
Qty: 0 | Refills: 0 | DISCHARGE

## 2022-11-09 RX ADMIN — Medication 1000 MILLIGRAM(S): at 05:47

## 2022-11-09 RX ADMIN — PANTOPRAZOLE SODIUM 40 MILLIGRAM(S): 20 TABLET, DELAYED RELEASE ORAL at 05:45

## 2022-11-09 RX ADMIN — Medication 650 MILLIGRAM(S): at 23:00

## 2022-11-09 RX ADMIN — Medication 650 MILLIGRAM(S): at 12:09

## 2022-11-09 RX ADMIN — Medication 20 MILLIGRAM(S): at 05:45

## 2022-11-09 RX ADMIN — Medication 1000 MILLIGRAM(S): at 17:28

## 2022-11-09 RX ADMIN — HEPARIN SODIUM 5000 UNIT(S): 5000 INJECTION INTRAVENOUS; SUBCUTANEOUS at 14:28

## 2022-11-09 RX ADMIN — Medication 40 MILLIGRAM(S): at 05:45

## 2022-11-09 RX ADMIN — Medication 20 MILLIGRAM(S): at 17:28

## 2022-11-09 RX ADMIN — SIMVASTATIN 10 MILLIGRAM(S): 20 TABLET, FILM COATED ORAL at 21:15

## 2022-11-09 RX ADMIN — Medication 360 MILLIGRAM(S): at 05:45

## 2022-11-09 RX ADMIN — CEFTRIAXONE 100 MILLIGRAM(S): 500 INJECTION, POWDER, FOR SOLUTION INTRAMUSCULAR; INTRAVENOUS at 20:30

## 2022-11-09 RX ADMIN — BUDESONIDE AND FORMOTEROL FUMARATE DIHYDRATE 1 PUFF(S): 160; 4.5 AEROSOL RESPIRATORY (INHALATION) at 09:22

## 2022-11-09 RX ADMIN — Medication 20 MILLIGRAM(S): at 12:09

## 2022-11-09 RX ADMIN — Medication 1 APPLICATION(S): at 12:11

## 2022-11-09 RX ADMIN — HEPARIN SODIUM 5000 UNIT(S): 5000 INJECTION INTRAVENOUS; SUBCUTANEOUS at 21:12

## 2022-11-09 RX ADMIN — HEPARIN SODIUM 5000 UNIT(S): 5000 INJECTION INTRAVENOUS; SUBCUTANEOUS at 05:47

## 2022-11-09 RX ADMIN — Medication 1 PUFF(S): at 09:22

## 2022-11-09 RX ADMIN — REMDESIVIR 500 MILLIGRAM(S): 5 INJECTION INTRAVENOUS at 12:11

## 2022-11-09 RX ADMIN — Medication 650 MILLIGRAM(S): at 20:43

## 2022-11-09 RX ADMIN — Medication 1 PUFF(S): at 14:32

## 2022-11-09 RX ADMIN — PREGABALIN 1000 MICROGRAM(S): 225 CAPSULE ORAL at 12:09

## 2022-11-09 NOTE — DISCHARGE NOTE PROVIDER - ATTENDING DISCHARGE PHYSICAL EXAMINATION:
T(C): 36.2 (11-11-22 @ 13:43), Max: 36.2 (11-10-22 @ 20:49)  HR: 83 (11-11-22 @ 13:43) (81 - 86)  BP: 130/60 (11-11-22 @ 13:43) (130/60 - 140/65)  RR: 18 (11-11-22 @ 13:43) (17 - 18)  SpO2: --    O/E:  Awake, alert, not in distress.  HEENT: atraumatic, EOMI.  Chest: decreased breath sounds at bases  CVS: SIS2 +, no murmur.  P/A: Soft, BS+  CNS: awake, alert  Ext: no edema feet.  All systems reviewed positive findings as above.

## 2022-11-09 NOTE — DISCHARGE NOTE PROVIDER - CARE PROVIDER_API CALL
Temo Krishnamurthy (DO)  Internal Medicine  [address] [facility], Admin - Room [address] [place] [address]  Phone: [phone]  Fax: [phone]  Follow Up Time: 2 weeks

## 2022-11-09 NOTE — DISCHARGE NOTE PROVIDER - NSDCMRMEDTOKEN_GEN_ALL_CORE_FT
acetaminophen 325 mg oral tablet: 2 tab(s) orally every 6 hours, As needed, Temp greater or equal to 38C (100.4F), Mild Pain (1 - 3)  budesonide-formoterol 80 mcg-4.5 mcg/inh inhalation aerosol: 1 inhaler(s) inhaled 2 times a day  cyanocobalamin 1000 mcg oral tablet: 1 tab(s) orally once a day  dicyclomine 20 mg oral tablet: 1 tab(s) orally 3 times a day (before meals)  dilTIAZem 360 mg/24 hours oral capsule, extended release: 1 cap(s) orally once a day  furosemide 20 mg oral tablet: 1 tab(s) orally once a day  ipratropium CFC free 17 mcg/inh inhalation aerosol: 1 puff(s) inhaled every 6 hours  LORazepam 0.5 mg oral tablet: 1 tab(s) orally once a day, As needed, Anxiety  meclizine 12.5 mg oral tablet: 1 tab(s) orally 3 times a day  melatonin 3 mg oral tablet: 1 tab(s) orally once a day (at bedtime)  pantoprazole 40 mg oral delayed release tablet: 1 tab(s) orally once a day (before a meal)  PeriGuard topical ointment: Apply topically to affected area 2 times a day  potassium chloride 20 mEq oral granule, extended release: 1 tab(s) orally once a day  predniSONE 10 mg oral tablet: 4 tab(s) orally once a day  predniSONE 10 mg oral tablet: 3 tab(s) orally once a day  predniSONE 10 mg oral tablet: 2 tab(s) orally once a day  simvastatin 10 mg oral tablet: 1 tab(s) orally once a day (at bedtime)  sucralfate 1 g/10 mL oral suspension: 10 milliliter(s) orally 2 times a day (before meals)   acetaminophen 325 mg oral tablet: 2 tab(s) orally every 6 hours, As needed, Temp greater or equal to 38C (100.4F), Mild Pain (1 - 3)  budesonide-formoterol 80 mcg-4.5 mcg/inh inhalation aerosol: 1 inhaler(s) inhaled 2 times a day  cyanocobalamin 1000 mcg oral tablet: 1 tab(s) orally once a day  dicyclomine 20 mg oral tablet: 1 tab(s) orally 3 times a day (before meals)  dilTIAZem 360 mg/24 hours oral capsule, extended release: 1 cap(s) orally once a day  furosemide 20 mg oral tablet: 1 tab(s) orally once a day  ipratropium CFC free 17 mcg/inh inhalation aerosol: 1 puff(s) inhaled every 6 hours  LORazepam 0.5 mg oral tablet: 1 tab(s) orally once a day, As needed, Anxiety  meclizine 12.5 mg oral tablet: 1 tab(s) orally 3 times a day, As Needed  melatonin 3 mg oral tablet: 1 tab(s) orally once a day (at bedtime)  pantoprazole 40 mg oral delayed release tablet: 1 tab(s) orally once a day (before a meal)  simvastatin 10 mg oral tablet: 1 tab(s) orally once a day (at bedtime)  sucralfate 1 g/10 mL oral suspension: 10 milliliter(s) orally 2 times a day (before meals)   acetaminophen 325 mg oral tablet: 2 tab(s) orally every 6 hours, As needed, Temp greater or equal to 38C (100.4F), Mild Pain (1 - 3)  budesonide-formoterol 80 mcg-4.5 mcg/inh inhalation aerosol: 1 puff(s) inhaled 2 times a day   dicyclomine 20 mg oral tablet: 1 tab(s) orally 3 times a day (before meals)  dilTIAZem 360 mg/24 hours oral capsule, extended release: 1 cap(s) orally once a day  furosemide 20 mg oral tablet: 1 tab(s) orally once a day  ipratropium CFC free 17 mcg/inh inhalation aerosol: 2 puff(s) inhaled every 6 hours   LORazepam 0.5 mg oral tablet: 1 tab(s) orally once a day, As needed, Anxiety  meclizine 12.5 mg oral tablet: 1 tab(s) orally 3 times a day, As Needed  melatonin 3 mg oral tablet: 1 tab(s) orally once a day (at bedtime)  pantoprazole 40 mg oral delayed release tablet: 1 tab(s) orally once a day (before a meal)  potassium chloride 20 mEq oral granule, extended release: 1 tab(s) orally once a day  predniSONE 10 mg oral tablet: 3 tab(s) orally once a day x 3 days  2 tab(s) orally once a day  simvastatin 10 mg oral tablet: 1 tab(s) orally once a day (at bedtime)  sucralfate 1 g/10 mL oral suspension: 10 milliliter(s) orally 2 times a day (before meals)  tamsulosin 0.4 mg oral capsule: 1 cap(s) orally once a day (at bedtime)

## 2022-11-09 NOTE — DISCHARGE NOTE PROVIDER - NSDCFUADDINST_GEN_ALL_CORE_FT
Please keep on O2 via nasal cannula 2-3 L/min to keep SpO2>92%.  Please use commode for toilet rehabilitation. Please keep on O2 via nasal cannula 2-3 L/min prn  to keep SpO2>92%.  Please use commode for toilet rehabilitation.

## 2022-11-09 NOTE — DISCHARGE NOTE PROVIDER - HOSPITAL COURSE
HPI:  87 years old Female with PMHx of HTN, Afib (on cardizem for rate control not on A/C only on heparin sc as of 3 weeks ago) NHL now in remission, COPD (was not on home oxygen until 3 weeks ago 2 L) who presents with the chief complaint of altered mental status. Pt at her baseline talks and is AAO x3 but according to the daughters she stopped talking two days ago. Hx goes back to 3 weeks ago when she was admitted to [facility] at [place] for covid and was started on supplemental Oxygen and steroid taper. Pt recovered well but was retaining urine so a Henry was placed at that time and pt was sent to [facility]. Pt was doing fine until 2 days ago when she stopped talking at all.    In the Ed pt VS: T 97.8, HR 80, /67 and oxygen sat 93% on 3 L  Labs showed WBC 5.08, hg 10.1, Bicarb 35,   UA was positive on admission.  (04 Nov 2022 19:04)    patient was admitted for treartment of metabolic encephalopathy secondary to suspected UTI and COVID infection.  She was treated with ceftriaxone and Remdesivir. Urine culture grew candida albicans. patient improved with treatment.  Patient is medically stable and ready for discharge. HPI:  87 years old Female with PMHx of HTN, Afib (on cardizem for rate control not on A/C only on heparin sc as of 3 weeks ago) NHL now in remission, COPD (was not on home oxygen until 3 weeks ago 2 L) who presents with the chief complaint of altered mental status. Pt at her baseline talks and is AAO x3 but according to the daughters she stopped talking two days ago. Hx goes back to 3 weeks ago when she was admitted to [facility] at [place] for covid and was started on supplemental Oxygen and steroid taper. Pt recovered well but was retaining urine so a Henry was placed at that time and pt was sent to [facility]. Pt was doing fine until 2 days ago when she stopped talking at all.    In the Ed pt VS: T 97.8, HR 80, /67 and oxygen sat 93% on 3 L  Labs showed WBC 5.08, hg 10.1, Bicarb 35,   UA was positive on admission.  (04 Nov 2022 19:04)    patient was admitted for treartment of metabolic encephalopathy secondary to  COVID infection.  She was treated with Remdesivir. Urine culture grew candida albicans. patient improved with treatment.  Patient is medically stable and ready for discharge.    # Metabolic encephalopathy - resolved  # Covid 19 infection  # pyuria  -  CT Head No Cont (11.04.22 @ 14:11) >No evidence of acute intracranial pathology. Air-fluid levels within the right maxillary and bilateral sphenoid sinuses, correlate for sinusitis. Moderate chronic microvascular type changes.  - Xray Chest 1 View- PORTABLE-Routine (Xray Chest 1 View- PORTABLE-Routine in AM.) (11.05.22 @ 06:48) >Unchanged bilateral parenchymal and bibasilar opacities.  - blood Culture Results: No growth to date. (11.06.22 @ 18:54)  - urine Culture Results: 50,000 - 99,000 CFU/mL Candida albicans  - S/p  RDV   -ID F/u:  Urine Cx with Candida albicans -- could be contaminant   -Procalcitonin, Serum: 0.13(11.09.22 @ 07:53)  - taper steroids to 20mg daily    # Urinary retention-resolved  - c/w  flomax    # Hypomagnesemia, Hypokalemia  - repleted mg, k    # Paroxysmal afib, now in SR  - c/w cardizem  - not on AC     # Hypertension  - c/w lasix, cardizem    # Dyslipidemia  - c/w statin    # H/o  NHL   - on prednisone

## 2022-11-09 NOTE — DISCHARGE NOTE PROVIDER - NSDCCPCAREPLAN_GEN_ALL_CORE_FT
PRINCIPAL DISCHARGE DIAGNOSIS  Diagnosis: Acute UTI  Assessment and Plan of Treatment:   You were noted either on arrival or during this hospitalization to have a Urinary Tract Infection. You may have already been treated and completed the antibiotics, please refer to the list of medications present on your discharge paperwork. If you notice that there are antibiotics listed, these may be to treat your infection, be sure to complete taking the full course, whether you have symptoms or not, as prescribed.  While taking antibiotics, you may benefit from taking a probiotic such as florastore to help to try and prevent an infectious type of diarrhea known as C Diff. If you notice that you begin having severe watery diarrhea, more than 4-5 episodes a day, please see your Primary Care Doctor or come to the ER to have your stool tested for this infection.   It is not necessary to repeat a urine test to see if the infection is gone, it is assumed that after treatment it should have resolved. However, if you continue to have symptoms, please see your Primary Care doctor or return to the ER.        SECONDARY DISCHARGE DIAGNOSES  Diagnosis: Toxic metabolic encephalopathy  Assessment and Plan of Treatment:      PRINCIPAL DISCHARGE DIAGNOSIS  Diagnosis: 2019 novel coronavirus disease (COVID-19)  Assessment and Plan of Treatment: Coronavirus disease 2019 (COVID-19) is a respiratory illness  that can spread from person to person. The virus that causes  COVID-19 is a novel coronavirus that was first identified during  an investigation into an outbreak in [place], [place].  The virus that causes COVID-19 probably emerged from an  animal source, but is now spreading from person to person.  The virus is thought to spread mainly between people who  are in close contact with one another (within about 6 feet)  through respiratory droplets produced when an infected  person coughs or sneezes. It also may be possible that a person  can get COVID-19 by touching a surface or object that has  the virus on it and then touching their own mouth, nose, or  possibly their eyes, but this is not thought to be the main  way the virus spreads.  Please stay home and avoid contact with others for at least a week after symptoms resolve and follow government guidelines.   Patients with COVID-19 have had mild to severe respiratory  illness with symptoms of  • fever  • cough  • shortness of breath  People can help protect themselves from respiratory illness with  everyday preventive actions.    • Avoid close contact with people who are sick.  • Avoid touching your eyes, nose, and mouth with  unwashed hands.  • Wash your hands often with soap and water for at least 20   seconds. Use an alcohol-based hand  that contains at  least 60% alcohol if soap and water are not available.   Stay home when you are sick.  • Cover your cough or sneeze with a tissue, then throw the  tissue in the trash.  • Clean and disinfect frequently touched objects  and surfaces.  Call 911 and inform them you are covid positive before you decide to go to the emergency room if you have chest pain, difficulty breathing, high fevers, worsening of your symptoms, feel unwell, or have nausea and vomiting.        SECONDARY DISCHARGE DIAGNOSES  Diagnosis: Toxic metabolic encephalopathy  Assessment and Plan of Treatment:

## 2022-11-10 LAB
ALBUMIN SERPL ELPH-MCNC: 2.9 G/DL — LOW (ref 3.5–5.2)
ALP SERPL-CCNC: 78 U/L — SIGNIFICANT CHANGE UP (ref 30–115)
ALT FLD-CCNC: 25 U/L — SIGNIFICANT CHANGE UP (ref 0–41)
AST SERPL-CCNC: 37 U/L — SIGNIFICANT CHANGE UP (ref 0–41)
BILIRUB DIRECT SERPL-MCNC: <0.2 MG/DL — SIGNIFICANT CHANGE UP (ref 0–0.3)
BILIRUB INDIRECT FLD-MCNC: >0.1 MG/DL — LOW (ref 0.2–1.2)
BILIRUB SERPL-MCNC: 0.3 MG/DL — SIGNIFICANT CHANGE UP (ref 0.2–1.2)
CREAT SERPL-MCNC: 0.7 MG/DL — SIGNIFICANT CHANGE UP (ref 0.7–1.5)
EGFR: 84 ML/MIN/1.73M2 — SIGNIFICANT CHANGE UP
INR BLD: 1.07 RATIO — SIGNIFICANT CHANGE UP (ref 0.65–1.3)
PROT SERPL-MCNC: 4.9 G/DL — LOW (ref 6–8)
PROTHROM AB SERPL-ACNC: 12.2 SEC — SIGNIFICANT CHANGE UP (ref 9.95–12.87)

## 2022-11-10 PROCEDURE — 99233 SBSQ HOSP IP/OBS HIGH 50: CPT

## 2022-11-10 RX ORDER — MAGNESIUM SULFATE 500 MG/ML
2 VIAL (ML) INJECTION ONCE
Refills: 0 | Status: DISCONTINUED | OUTPATIENT
Start: 2022-11-10 | End: 2022-11-10

## 2022-11-10 RX ORDER — POLYETHYLENE GLYCOL 3350 17 G/17G
17 POWDER, FOR SOLUTION ORAL ONCE
Refills: 0 | Status: COMPLETED | OUTPATIENT
Start: 2022-11-10 | End: 2022-11-10

## 2022-11-10 RX ORDER — MECLIZINE HCL 12.5 MG
12.5 TABLET ORAL THREE TIMES A DAY
Refills: 0 | Status: DISCONTINUED | OUTPATIENT
Start: 2022-11-10 | End: 2022-11-11

## 2022-11-10 RX ORDER — MAGNESIUM OXIDE 400 MG ORAL TABLET 241.3 MG
400 TABLET ORAL ONCE
Refills: 0 | Status: COMPLETED | OUTPATIENT
Start: 2022-11-10 | End: 2022-11-10

## 2022-11-10 RX ORDER — SENNA PLUS 8.6 MG/1
1 TABLET ORAL ONCE
Refills: 0 | Status: DISCONTINUED | OUTPATIENT
Start: 2022-11-10 | End: 2022-11-10

## 2022-11-10 RX ORDER — TAMSULOSIN HYDROCHLORIDE 0.4 MG/1
0.4 CAPSULE ORAL AT BEDTIME
Refills: 0 | Status: DISCONTINUED | OUTPATIENT
Start: 2022-11-10 | End: 2022-11-11

## 2022-11-10 RX ORDER — POTASSIUM CHLORIDE 20 MEQ
40 PACKET (EA) ORAL EVERY 4 HOURS
Refills: 0 | Status: COMPLETED | OUTPATIENT
Start: 2022-11-10 | End: 2022-11-10

## 2022-11-10 RX ORDER — TAMSULOSIN HYDROCHLORIDE 0.4 MG/1
0.4 CAPSULE ORAL ONCE
Refills: 0 | Status: COMPLETED | OUTPATIENT
Start: 2022-11-10 | End: 2022-11-10

## 2022-11-10 RX ADMIN — HEPARIN SODIUM 5000 UNIT(S): 5000 INJECTION INTRAVENOUS; SUBCUTANEOUS at 05:54

## 2022-11-10 RX ADMIN — Medication 1 APPLICATION(S): at 12:41

## 2022-11-10 RX ADMIN — MAGNESIUM OXIDE 400 MG ORAL TABLET 400 MILLIGRAM(S): 241.3 TABLET ORAL at 17:25

## 2022-11-10 RX ADMIN — BUDESONIDE AND FORMOTEROL FUMARATE DIHYDRATE 1 PUFF(S): 160; 4.5 AEROSOL RESPIRATORY (INHALATION) at 20:00

## 2022-11-10 RX ADMIN — HEPARIN SODIUM 5000 UNIT(S): 5000 INJECTION INTRAVENOUS; SUBCUTANEOUS at 21:27

## 2022-11-10 RX ADMIN — PANTOPRAZOLE SODIUM 40 MILLIGRAM(S): 20 TABLET, DELAYED RELEASE ORAL at 05:55

## 2022-11-10 RX ADMIN — Medication 12.5 MILLIGRAM(S): at 05:55

## 2022-11-10 RX ADMIN — Medication 40 MILLIEQUIVALENT(S): at 21:29

## 2022-11-10 RX ADMIN — Medication 20 MILLIGRAM(S): at 12:41

## 2022-11-10 RX ADMIN — Medication 1 PUFF(S): at 07:59

## 2022-11-10 RX ADMIN — Medication 1000 MILLIGRAM(S): at 17:23

## 2022-11-10 RX ADMIN — REMDESIVIR 500 MILLIGRAM(S): 5 INJECTION INTRAVENOUS at 12:42

## 2022-11-10 RX ADMIN — Medication 1000 MILLIGRAM(S): at 05:56

## 2022-11-10 RX ADMIN — POLYETHYLENE GLYCOL 3350 17 GRAM(S): 17 POWDER, FOR SOLUTION ORAL at 21:29

## 2022-11-10 RX ADMIN — Medication 40 MILLIGRAM(S): at 05:54

## 2022-11-10 RX ADMIN — Medication 20 MILLIGRAM(S): at 07:57

## 2022-11-10 RX ADMIN — Medication 30 MILLIGRAM(S): at 05:55

## 2022-11-10 RX ADMIN — Medication 40 MILLIEQUIVALENT(S): at 12:40

## 2022-11-10 RX ADMIN — Medication 1 PUFF(S): at 14:50

## 2022-11-10 RX ADMIN — BUDESONIDE AND FORMOTEROL FUMARATE DIHYDRATE 1 PUFF(S): 160; 4.5 AEROSOL RESPIRATORY (INHALATION) at 07:57

## 2022-11-10 RX ADMIN — SIMVASTATIN 10 MILLIGRAM(S): 20 TABLET, FILM COATED ORAL at 21:27

## 2022-11-10 RX ADMIN — Medication 1 PUFF(S): at 20:00

## 2022-11-10 RX ADMIN — TAMSULOSIN HYDROCHLORIDE 0.4 MILLIGRAM(S): 0.4 CAPSULE ORAL at 12:40

## 2022-11-10 RX ADMIN — Medication 40 MILLIEQUIVALENT(S): at 17:25

## 2022-11-10 RX ADMIN — TAMSULOSIN HYDROCHLORIDE 0.4 MILLIGRAM(S): 0.4 CAPSULE ORAL at 21:31

## 2022-11-10 RX ADMIN — HEPARIN SODIUM 5000 UNIT(S): 5000 INJECTION INTRAVENOUS; SUBCUTANEOUS at 14:50

## 2022-11-10 RX ADMIN — Medication 20 MILLIGRAM(S): at 17:22

## 2022-11-10 RX ADMIN — Medication 650 MILLIGRAM(S): at 12:40

## 2022-11-10 RX ADMIN — Medication 360 MILLIGRAM(S): at 05:54

## 2022-11-10 RX ADMIN — Medication 650 MILLIGRAM(S): at 13:20

## 2022-11-10 NOTE — PROGRESS NOTE ADULT - TIME BILLING
Direct patient care. Discussed with housestaff
I have personally seen and examined this patient.    I have reviewed all pertinent clinical information and reviewed all relevant imaging and diagnostic studies personally.   I counseled the patient about diagnostic testing and treatment plan. All questions were answered.   I discussed recommendations with the primary team.
Direct patient care. Discussed with Housestaff

## 2022-11-10 NOTE — PROGRESS NOTE ADULT - ASSESSMENT
87 years old Female with PMHx of HTN, Afib (on cardizem for rate control on heparin sc as of 3 weeks ago) NHL now in remission, COPD (was not on home oxygen until 3 weeks ago 2 L) who presents with the chief complaint of altered mental status.     # Metabolic encephalopathy secondary to  Urinary tract infection vs. covid versus long covid   - c/w IV rocephin  , prednisone taper  ,   id  consult - remdesivir for 5 days   Urine culture negative   eeg     # Paroxysmal Afib,   rate controlled on anticoagulation     # HTN, stable  BP: 148/66 (07 Nov 2022 06:57) (139/64 - 148/66)  controlled     #Hypokalemia persistent  potassium repletion     # DLD   - c/w statin    #Anemia no indication for transfusion     # h/o NHL     #Insomnia melatonin     #Suspected b12 deficiency on supplementation     # DVT Prophylaxis - on heparin subcut    # GI Prophylaxis - on sucralfate and protonix    #FULL CODE     #Guarded prognosis     PROGRESS NOTE HANDOFF    Pending: monitor mental status , continue with antibiotics for now     Family discussion: [phone] called 10:51  AM spoke with Zane reviewed above     Disposition:  snf   
87 years old Female with PMHx of HTN, Afib (on cardizem for rate control on heparin sc as of 3 weeks ago) NHL now in remission, COPD (was not on home oxygen until 3 weeks ago 2 L) who presents with the chief complaint of altered mental status.     # Metabolic encephalopathy 2/2 infectious etiology  # Urinary tract infection vs. Pneumonia (noted CXR with RLL opacity)  - c/w IV rocephin  - as per neurology, EEG  - check B12, Folate and TSH  - resume diet after speech and swallow evaluation  - PT/Rehab    # Acute respiratory failure 2/2 CHF vs. COPD exacerbation  # Right lower lobe opacity likely pneumonia  # Recent COVID-19  - c/w IV ceftriaxone and azithromycin  - on prednisone taper, c/w symbicort and ipratropium   - clinical euvolemic will switch to po lasix 40 mg daily  - pending 2D echo  - strict intake output, daily weights, fluid restriction    # Paroxysmal Afib, remained in sinus  - c/w cardizem  - not on A/C    # HTN, stable  - c/w home BP meds    # DLD   - c/w statin    # h/o NHL   - f/u as outpt    # DVT Prophylaxis - on heparin subcut  # GI Prophylaxis - on sucralfate and protonix    
87 years old Female with PMHx of HTN, Afib (on cardizem for rate control on heparin sc as of 3 weeks ago) NHL now in remission, COPD (was not on home oxygen until 3 weeks ago 2 L) who presents with the chief complaint of altered mental status.     # Metabolic encephalopathy secondary to  Urinary tract infection vs. gram negative suspected pneumonia in the setting of recent covid infection    - c/w IV rocephin and azithromycin , prednisone taper  ,   speaking with family finished course of doxycyline and remdesivir   will consult infectious disease   e coli on outpatient cultures per patient     # Paroxysmal Afib,   rate controlled on anticoagulation     # HTN, stable  BP: 138/63 (06 Nov 2022 05:03) (138/63 - 184/79)  controlled     #Hypokalemia potassium repletion     # DLD   - c/w statin    #Anemia no indication for transfusion     # h/o NHL     #Insomnia melatonin     #Suspected b12 deficiency on supplementation     # DVT Prophylaxis - on heparin subcut    # GI Prophylaxis - on sucralfate and protonix    #FULL CODE     PROGRESS NOTE HANDOFF    Pending: infectious disease evaluation     Family discussion: [phone] called 10:48 AM spoke with Zane reviewed above     Disposition:  snf   
The patient 87y old female brought for not being able to talk after she was asleep for the last two days as per daughter. Exam showed no clear focality, but she was not answering questions. CTH showed no acute intracranial lesions, making stroke less likely. Her abnormal UA, and abnormal breathing (she is on nasal cannula) makes toxic/metabolic etiology more likely. Seizure activity is less likely but to be considered. She is better than on presenting day.     Recommendations:  - Treat her toxic/metabolic issues   - rEEG pending   - If no improvement on treatment may consider MR brain   
87 years old Female with PMHx of HTN, Afib (on cardizem for rate control on heparin sc as of 3 weeks ago) NHL now in remission, COPD (was not on home oxygen until 3 weeks ago 2 L) who presents with the chief complaint of altered mental status.     # Metabolic encephalopathy secondary to  Urinary tract infection vs. covid versus long covid   - c/w IV rocephin  , prednisone taper  ,   id  consult - remdesivir for 7 days   Urine culture negative   improving mental status     # Paroxysmal Afib,   rate controlled, on anticoagulation     # HTN, stable  BP: 131/63 (08 Nov 2022 04:43) (131/63 - 137/78)  controlled     #Hypokalemia persistent  potassium repletion     # DLD   - c/w statin    #Anemia no indication for transfusion     #Hypernatremia half normal saline     # h/o NHL     #Insomnia melatonin     #Suspected b12 deficiency on supplementation     # DVT Prophylaxis - on heparin subcut    # GI Prophylaxis - on sucralfate and protonix    #FULL CODE     #Guarded prognosis     PROGRESS NOTE HANDOFF    Pending: pt rehab , monitor ms     Family discussion: [phone] called 12:18 PM spoke with Zane reviewed above     Disposition:  snf   
    ASSESSMENT  87 years old Female with PMHx of HTN, Afib (on cardizem for rate control not on A/C only on heparin sc as of 3 weeks ago) NHL now in remission, COPD (was not on home oxygen until 3 weeks ago 2 L) who presents with the chief complaint of altered mental status    IMPRESSION  #COVID-19, on supplemental O2  #Pyuria - urine Cx growing Candida albicans   #Metabolic Encephalopathy     #NHL in remission  #COPD - on 2L NC at home  #Atrial Fibrillation   #Abx allergy: NKDA    RECOMMENDATIONS  - appears more arrousable since last seen   - continue RDV for 7 day course  - Urine Cx with Candida albicans -- could be contaminant   - check procalcitonin -- if < 0.25, stop antibiotics   - vang in place    Please call or message on Microsoft Teams if with any questions.  Spectra [phone]    
87 years old Female with PMHx of HTN, Afib (on cardizem for rate control not on A/C only on heparin sc as of 3 weeks ago) NHL now in remission, COPD (was not on home oxygen until 3 weeks ago 2 L) who presents with the chief complaint of altered mental status. Pt at her baseline talks and is AAO x3 but according to the daughters she stopped talking two days ago. Hx goes back to 3 weeks ago when she was admitted to [facility] at [place] for covid and was started on supplemental Oxygen and steroid taper. Pt recovered well but was retaining urine so a Henry was placed at that time and pt was sent to [facility]. Pt was doing fine until 2 days ago when she stopped talking at all.    # Metabolic encephalopathy - resolved  # Covid 19 infection  # pyuria  -  CT Head No Cont (11.04.22 @ 14:11) >No evidence of acute intracranial pathology. Air-fluid levels within the right maxillary and bilateral sphenoid sinuses, correlate for sinusitis. Moderate chronic microvascular type changes.  - Xray Chest 1 View- PORTABLE-Routine (Xray Chest 1 View- PORTABLE-Routine in AM.) (11.05.22 @ 06:48) >Unchanged bilateral parenchymal and bibasilar opacities.  - blood Culture Results: No growth to date. (11.06.22 @ 18:54)  - urine Culture Results: 50,000 - 99,000 CFU/mL Candida albicans  - c/w  RDV for 7 day course  -ID F/u:  Urine Cx with Candida albicans -- could be contaminant   -Procalcitonin, Serum: 0.13(11.09.22 @ 07:53)  - taper steroids  - DC ceftriaxone    # Urinary retention  - straight cath  - Start flomax  - bladder scan daily after voiding    # Hypomagnesemia, Hypokalemia  - repleted mg, k    # Paroxysmal afib, now in SR  - c/w cardizem  - not on AC     # Hypertension  - c/w lasix, cardizem    # Dyslipidemia  - c/w statin    # H/o  NHL     # DVT prophylaxis    # Pending: clinical improvement.  # Discussed plan of care with patient  # Disposition: STR    
87 years old Female with PMHx of HTN, Afib (on cardizem for rate control not on A/C only on heparin sc as of 3 weeks ago) NHL now in remission, COPD (was not on home oxygen until 3 weeks ago 2 L) who presents with the chief complaint of altered mental status. Pt at her baseline talks and is AAO x3 but according to the daughters she stopped talking two days ago. Hx goes back to 3 weeks ago when she was admitted to [facility] at [place] for covid and was started on supplemental Oxygen and steroid taper. Pt recovered well but was retaining urine so a Henry was placed at that time and pt was sent to [facility]. Pt was doing fine until 2 days ago when she stopped talking at all.    # Metabolic encephalopathy - resolved  # Covid 19 infection  # pyuria  -  CT Head No Cont (11.04.22 @ 14:11) >No evidence of acute intracranial pathology. Air-fluid levels within the right maxillary and bilateral sphenoid sinuses, correlate for sinusitis. Moderate chronic microvascular type changes.  - Xray Chest 1 View- PORTABLE-Routine (Xray Chest 1 View- PORTABLE-Routine in AM.) (11.05.22 @ 06:48) >Unchanged bilateral parenchymal and bibasilar opacities.  - blood Culture Results: No growth to date. (11.06.22 @ 18:54)  - urine Culture Results: 50,000 - 99,000 CFU/mL Candida albicans  - c/w  RDV for 7 day course  -ID F/u:  Urine Cx with Candida albicans -- could be contaminant   - F/u procal  - taper steroids  - c/w ceftriaxone    # Hypomagnesemia, Hypokalemia  - replete mg, k    # Paroxysmal afib, now in SR  - c/w cardizem  - not on AC     # Hypertension  - c/w lasix, cardizem    # Dyslipidemia  - c/w statin    # H/o  NHL     # DVT prophylaxis    # Pending: clinical improvement, PT eval, F/u procal  # Discussed plan of care with patient  # Disposition: probably STR

## 2022-11-10 NOTE — PROGRESS NOTE ADULT - SUBJECTIVE AND OBJECTIVE BOX
ANUJAJOSE ALEJANDROTRICIA DUVALL  87y Female    Patient is a 87y old  Female who presents with a chief complaint of AMS    INTERVAL HPI/OVERNIGHT EVENTS:    ROS: Pt is poor historian. Pt is seen and examined at bedside. Pt denies fever, chills, SOB, cough, chest pain or abdominal pain.    Overnight events: No acute events overnight.    Vital Signs Last 24 Hrs  T(C): 36.6 (05 Nov 2022 07:59), Max: 36.6 (05 Nov 2022 07:59)  T(F): 97.9 (05 Nov 2022 07:59), Max: 97.9 (05 Nov 2022 07:59)  HR: 109 (05 Nov 2022 07:59) (109 - 109)  BP: 154/75 (05 Nov 2022 07:59) (154/75 - 154/75)  BP(mean): --  RR: 17 (05 Nov 2022 07:59) (17 - 17)  SpO2: 98% (05 Nov 2022 07:59) (98% - 98%)    Parameters below as of 05 Nov 2022 07:59  Patient On (Oxygen Delivery Method): nasal cannula        No Known Allergies      MEDICATIONS  (STANDING):  azithromycin  IVPB      azithromycin  IVPB 500 milliGRAM(s) IV Intermittent every 24 hours  budesonide  80 MICROgram(s)/formoterol 4.5 MICROgram(s) Inhaler 1 Puff(s) Inhalation two times a day  cefTRIAXone   IVPB 1000 milliGRAM(s) IV Intermittent every 24 hours  cyanocobalamin 1000 MICROGram(s) Oral daily  dicyclomine 20 milliGRAM(s) Oral three times a day before meals  diltiazem    milliGRAM(s) Oral daily  furosemide   Injectable 40 milliGRAM(s) IV Push daily  heparin   Injectable 5000 Unit(s) SubCutaneous every 8 hours  ipratropium    for Nebulization 500 MICROGram(s) Nebulizer every 6 hours  meclizine 12.5 milliGRAM(s) Oral three times a day  melatonin 3 milliGRAM(s) Oral at bedtime  pantoprazole    Tablet 40 milliGRAM(s) Oral before breakfast  predniSONE   Tablet 40 milliGRAM(s) Oral daily  predniSONE   Tablet   Oral   simvastatin 10 milliGRAM(s) Oral at bedtime  sucralfate Oral Liquid - Peds 1000 milliGRAM(s) Oral two times a day before meals    MEDICATIONS  (PRN):  acetaminophen     Tablet .. 650 milliGRAM(s) Oral every 6 hours PRN Temp greater or equal to 38C (100.4F), Mild Pain (1 - 3)  guaiFENesin  milliGRAM(s) Oral every 12 hours PRN Cough  LORazepam     Tablet 0.5 milliGRAM(s) Oral daily PRN Anxiety      PHYSICAL EXAM:  General Appearance: NAD, normal for age and gender. 	  Neck: Normal JVP, no bruit.   Eyes: Conjunctiva clear, Extra Ocular muscles intact. No scleral icterus.  ENMT: Moist oral mucosa. No oral lesion.  Cardiovascular: Regular rate and rhythm S1 S2, No JVD, No murmurs.  Respiratory: Decreased breath sounds at bases. Bilateral rhonchi.  Psychiatry: Alert and oriented x 1, Calm. Patient is talking and answering questions but requesting Beth and a cookie  Gastrointestinal:  Soft, Non-tender, Non-distended.  : Henry present.  Neurologic: Non-focal deficits.  Musculoskeletal/ extremities: No joint swelling. No clubbing, cyanosis or edema.  Vascular: Peripheral pulses palpable bilaterally.  Skin/Integumen: No rashes, No ecchymoses, No cyanosis.    LABS:                        10.2   4.78  )-----------( 214      ( 05 Nov 2022 05:38 )             30.0     11-05    140  |  95<L>  |  17  ----------------------------<  88  3.9   |  33<H>  |  0.7    Ca    8.6      05 Nov 2022 05:38  Mg     1.6     11-05    TPro  5.3<L>  /  Alb  3.1<L>  /  TBili  0.4  /  DBili  x   /  AST  21  /  ALT  27  /  AlkPhos  71  11-05          RADIOLOGY & ADDITIONAL TESTS:            
Neurology Progress Note    Interval History:  The patient was seen and examined at the bedside. No new issues, no new complaint. The patient was slightly better, and answered OK when asked how she is doing.       PAST MEDICAL & SURGICAL HISTORY:  Afib    HTN (hypertension)    NHL (non-Hodgkin&#x27;s lymphoma)            Medications:  acetaminophen     Tablet .. 650 milliGRAM(s) Oral every 6 hours PRN  azithromycin  IVPB      azithromycin  IVPB 500 milliGRAM(s) IV Intermittent every 24 hours  budesonide  80 MICROgram(s)/formoterol 4.5 MICROgram(s) Inhaler 1 Puff(s) Inhalation two times a day  cefTRIAXone   IVPB 1000 milliGRAM(s) IV Intermittent every 24 hours  cyanocobalamin 1000 MICROGram(s) Oral daily  dicyclomine 20 milliGRAM(s) Oral three times a day before meals  diltiazem    milliGRAM(s) Oral daily  furosemide    Tablet 40 milliGRAM(s) Oral daily  guaiFENesin  milliGRAM(s) Oral every 12 hours PRN  heparin   Injectable 5000 Unit(s) SubCutaneous every 8 hours  ipratropium    for Nebulization 500 MICROGram(s) Nebulizer every 6 hours  LORazepam     Tablet 0.5 milliGRAM(s) Oral daily PRN  meclizine 12.5 milliGRAM(s) Oral three times a day  melatonin 3 milliGRAM(s) Oral at bedtime  pantoprazole    Tablet 40 milliGRAM(s) Oral before breakfast  potassium chloride    Tablet ER 20 milliEquivalent(s) Oral every 2 hours  predniSONE   Tablet 40 milliGRAM(s) Oral daily  predniSONE   Tablet   Oral   simvastatin 10 milliGRAM(s) Oral at bedtime  sucralfate Oral Liquid - Peds 1000 milliGRAM(s) Oral two times a day before meals      Vital Signs Last 24 Hrs  T(C): 36.9 (2022 05:03), Max: 37.6 (2022 00:34)  T(F): 98.4 (2022 05:03), Max: 99.6 (2022 00:34)  HR: 83 (2022 05:03) (83 - 93)  BP: 138/63 (2022 05:03) (138/63 - 184/79)  BP(mean): --  RR: 18 (2022 05:03) (18 - 20)  SpO2: 97% (2022 05:03) (97% - 98%)    Parameters below as of 2022 05:03  Patient On (Oxygen Delivery Method): nasal cannula  O2 Flow (L/min): 3      Neurological Exam:   General:  Appearance is consistent with chronologic age.  No abnormal facies.  Gross skin survey within normal limits.    Cognitive/Language:  The patient is not taking, but follow simple commands, showing two fingers, and follow moving objects     Eyes: intact VFF grossly to threaten reflex. EOMI w/o nystagmus, skew or reported double vision.  PERRL.  No ptosis/weakness of eyelid closure.    Face:  no facial asymmetry.    Motor examination:  Normal tone, bulk and range of motion.  No tenderness, twitching, tremors or involuntary movements.  Formal Muscle Strength Testin/5 arms, she was not moving her legs to commands, but withdraw bilaterally to noxious stimuli  Reflexes: 2+ b/l biceps, triceps, brachioradialis, patella and Achilles.  Plantar response downgoing b/l.   Sensory examination:  response to noxious stimuli in all 4 limbs  Gait deferred     Labs:  CBC Full  -  ( 2022 08:23 )  WBC Count : 4.82 K/uL  RBC Count : 2.88 M/uL  Hemoglobin : 9.7 g/dL  Hematocrit : 28.2 %  Platelet Count - Automated : 208 K/uL  Mean Cell Volume : 97.9 fL  Mean Cell Hemoglobin : 33.7 pg  Mean Cell Hemoglobin Concentration : 34.4 g/dL  Auto Neutrophil # : 2.48 K/uL  Auto Lymphocyte # : 1.03 K/uL  Auto Monocyte # : 0.53 K/uL  Auto Eosinophil # : 0.03 K/uL  Auto Basophil # : 0.02 K/uL  Auto Neutrophil % : 51.5 %  Auto Lymphocyte % : 21.4 %  Auto Monocyte % : 11.0 %  Auto Eosinophil % : 0.6 %  Auto Basophil % : 0.4 %        139  |  95<L>  |  15  ----------------------------<  112<H>  3.1<L>   |  34<H>  |  0.5<L>    Ca    8.2<L>      2022 08:23  Mg     2.0         TPro  5.1<L>  /  Alb  3.3<L>  /  TBili  0.3  /  DBili  x   /  AST  22  /  ALT  30  /  AlkPhos  67  11-06    LIVER FUNCTIONS - ( 2022 08:23 )  Alb: 3.3 g/dL / Pro: 5.1 g/dL / ALK PHOS: 67 U/L / ALT: 30 U/L / AST: 22 U/L / GGT: x             Urinalysis Basic - ( 2022 16:27 )    Color: Yellow / Appearance: Slightly Turbid / S.019 / pH: x  Gluc: x / Ketone: Negative  / Bili: Negative / Urobili: <2 mg/dL   Blood: x / Protein: 30 mg/dL / Nitrite: Positive   Leuk Esterase: Large / RBC: 21 /HPF /  /HPF   Sq Epi: x / Non Sq Epi: 0 /HPF / Bacteria: Negative      
Patient is a 87y old  Female who presents with a chief complaint of AMS (08 Nov 2022 12:18)    Patient was seen and examined.  no new complaints  All systems reviewed    PAST MEDICAL & SURGICAL HISTORY:  Afib  HTN (hypertension)  NHL (non-Hodgkin&#x27;s lymphoma)    Allergies  No Known Allergies    Home Medications:  budesonide-formoterol 80 mcg-4.5 mcg/inh inhalation aerosol: 1 puff(s) inhaled 2 times a day (04 Nov 2022 19:54)  cyanocobalamin 100 mcg oral tablet: 1 tab(s) orally once a day (04 Nov 2022 19:54)  dicyclomine 10 mg oral capsule: 2 cap(s) orally every 8 hours (04 Nov 2022 19:54)  DilTIAZem (Eqv-Cardizem CD) 360 mg/24 hours oral capsule, extended release: 1 cap(s) orally once a day (04 Nov 2022 19:54)  furosemide 20 mg oral tablet: 1 tab(s) orally once a day (04 Nov 2022 19:54)  heparin 5000 units/mL injectable solution: 1 milliliter(s) injectable every 8 hours (04 Nov 2022 19:54)  ipratropium 21 mcg/inh (0.03%) nasal spray: 2 spray(s) nasal 4 times a day (04 Nov 2022 19:54)  levoFLOXacin 500 mg oral tablet: 1 tab(s) orally every 24 hours (04 Nov 2022 19:54)  LORazepam 0.5 mg oral tablet: 1 tab(s) orally once a day, As Needed (04 Nov 2022 19:54)  meclizine 12.5 mg oral tablet: 1 tab(s) orally 3 times a day (04 Nov 2022 19:54)  Melatonin 3 mg oral tablet: 1 tab(s) orally once a day (at bedtime) (04 Nov 2022 19:54)  pantoprazole 40 mg oral delayed release tablet: 1 tab(s) orally once a day (04 Nov 2022 19:54)  PeriGuard topical ointment: Apply topically to affected area 2 times a day (04 Nov 2022 19:54)  potassium chloride 20 mEq oral granule, extended release: 1 tab(s) orally once a day (04 Nov 2022 19:54)  predniSONE 10 mg oral tablet: 4 tab(s) orally once a day (04 Nov 2022 19:54)  predniSONE 10 mg oral tablet: 3 tab(s) orally once a day (04 Nov 2022 19:54)  predniSONE 10 mg oral tablet: 2 tab(s) orally once a day (04 Nov 2022 19:54)  simvastatin 10 mg oral tablet: 1 tab(s) orally once a day (at bedtime) (04 Nov 2022 19:54)  sucralfate 1 g/10 mL oral suspension: 10 milliliter(s) orally 2 times a day (before meals) (04 Nov 2022 19:54)  Tylenol 325 mg oral tablet: 2 tab(s) orally every 6 hours, As Needed (04 Nov 2022 19:54)      MEDICATIONS  (STANDING):  bacitracin   Ointment 1 Application(s) Topical daily  budesonide  80 MICROgram(s)/formoterol 4.5 MICROgram(s) Inhaler 1 Puff(s) Inhalation two times a day  cefTRIAXone   IVPB 1000 milliGRAM(s) IV Intermittent every 24 hours  cyanocobalamin 1000 MICROGram(s) Oral daily  dicyclomine 20 milliGRAM(s) Oral three times a day before meals  diltiazem    milliGRAM(s) Oral daily  furosemide    Tablet 40 milliGRAM(s) Oral daily  heparin   Injectable 5000 Unit(s) SubCutaneous every 8 hours  ipratropium 17 MICROgram(s) HFA Inhaler 1 Puff(s) Inhalation every 6 hours  meclizine 12.5 milliGRAM(s) Oral three times a day  melatonin 3 milliGRAM(s) Oral at bedtime  pantoprazole    Tablet 40 milliGRAM(s) Oral before breakfast  predniSONE   Tablet   Oral   remdesivir  IVPB   IV Intermittent   remdesivir  IVPB 100 milliGRAM(s) IV Intermittent every 24 hours  simvastatin 10 milliGRAM(s) Oral at bedtime  sucralfate Oral Liquid - Peds 1000 milliGRAM(s) Oral two times a day before meals    MEDICATIONS  (PRN):  acetaminophen     Tablet .. 650 milliGRAM(s) Oral every 6 hours PRN Temp greater or equal to 38C (100.4F), Mild Pain (1 - 3)  guaiFENesin  milliGRAM(s) Oral every 12 hours PRN Cough  LORazepam     Tablet 0.5 milliGRAM(s) Oral daily PRN Anxiety    Vital Signs Last 24 Hrs  T(C): 36.6  T(F): 97.9  HR: 78  BP: 143/61  BP(mean): --  RR: 18  SpO2: 98%    O/E:  Awake, alert, not in distress.  HEENT: atraumatic, EOMI.  Chest: decreased breath sounds at bases  CVS: SIS2 +, no murmur.  P/A: Soft, BS+  CNS: awake, alert  Ext: no edema feet.  All systems reviewed positive findings as above.                          11.3<L>  7.82  )-----------( 292      ( 09 Nov 2022 07:53 )             32.9<L>                        9.7<L>  5.59  )-----------( 254      ( 08 Nov 2022 07:53 )             28.8<L>    11-09    142  |  93<L>  |  21<H>  ----------------------------<  131<H>  3.4<L>   |  36<H>  |  0.6<L>  11-08    147<H>  |  97<L>  |  24<H>  ----------------------------<  107<H>  3.9   |  32  |  0.8    Ca    8.9      09 Nov 2022 07:53  Ca    8.8      08 Nov 2022 07:53  Mg     1.5     11-09    TPro  5.4<L>  /  Alb  3.3<L>  /  TBili  0.3  /  DBili  <0.2  /  AST  20  /  ALT  23  /  AlkPhos  75  11-09  TPro  5.3<L>  /  Alb  3.1<L>  /  TBili  0.2  /  DBili  <0.2  /  AST  18  /  ALT  24  /  AlkPhos  67  11-08  TPro  5.3<L>  /  Alb  3.3<L>  /  TBili  0.3  /  DBili  <0.2  /  AST  16  /  ALT  25  /  AlkPhos  72  11-07          PT/INR - ( 09 Nov 2022 07:53 )   PT: 11.80 sec;   INR: 1.03 ratio               Culture - Blood (collected 06 Nov 2022 18:54)  Source: .Blood Blood  Preliminary Report (08 Nov 2022 02:02):    No growth to date.        
TRICIA DONIS  87y, Female  Allergy: No Known Allergies      LOS  4d    CHIEF COMPLAINT: AMS (08 Nov 2022 10:07)      INTERVAL EVENTS/HPI  - No acute events overnight  - T(F): , Max: 97.7 (11-07-22 @ 19:59)  - Denies any worsening symptoms  - Tolerating medication  - WBC Count: 5.59 (11-08-22 @ 07:53)  WBC Count: 4.88 (11-07-22 @ 07:19)     - Creatinine, Serum: 0.8 (11-08-22 @ 07:53)  Creatinine, Serum: 1.0 (11-07-22 @ 18:28)       ROS  General: Denies rigors, nightsweats  HEENT: Denies headache, rhinorrhea, sore throat, eye pain  CV: Denies CP, palpitations  PULM: Denies wheezing, hemoptysis  GI: Denies hematemesis, hematochezia, melena  : Denies discharge, hematuria  MSK: Denies arthralgias, myalgias  SKIN: Denies rash, lesions  NEURO: Denies paresthesias, weakness  PSYCH: Denies depression, anxiety    VITALS:  T(F): 97.5, Max: 97.7 (11-07-22 @ 19:59)  HR: 62  BP: 131/63  RR: 18Vital Signs Last 24 Hrs  T(C): 36.4 (08 Nov 2022 04:43), Max: 36.5 (07 Nov 2022 19:59)  T(F): 97.5 (08 Nov 2022 04:43), Max: 97.7 (07 Nov 2022 19:59)  HR: 62 (08 Nov 2022 04:43) (62 - 76)  BP: 131/63 (08 Nov 2022 04:43) (131/63 - 137/78)  BP(mean): --  RR: 18 (08 Nov 2022 04:43) (18 - 18)  SpO2: 99% (08 Nov 2022 04:43) (99% - 99%)    Parameters below as of 08 Nov 2022 04:43  Patient On (Oxygen Delivery Method): nasal cannula  O2 Flow (L/min): 2      PHYSICAL EXAM:  Gen: NAD, resting in bed  HEENT: Normocephalic, atraumatic  Neck: supple, no lymphadenopathy  CV: Regular rate & regular rhythm  Lungs: decreased BS at bases, no fremitus  Abdomen: Soft, BS present  Ext: Warm, well perfused  Neuro: non focal, awake  Skin: no rash, no erythema  Lines: no phlebitis    FH: Non-contributory  Social Hx: Non-contributory    TESTS & MEASUREMENTS:                        9.7    5.59  )-----------( 254      ( 08 Nov 2022 07:53 )             28.8     11-08    147<H>  |  97<L>  |  24<H>  ----------------------------<  107<H>  3.9   |  32  |  0.8    Ca    8.8      08 Nov 2022 07:53  Mg     1.8     11-07    TPro  5.3<L>  /  Alb  3.1<L>  /  TBili  0.2  /  DBili  <0.2  /  AST  18  /  ALT  24  /  AlkPhos  67  11-08      LIVER FUNCTIONS - ( 08 Nov 2022 07:53 )  Alb: 3.1 g/dL / Pro: 5.3 g/dL / ALK PHOS: 67 U/L / ALT: 24 U/L / AST: 18 U/L / GGT: x               Culture - Blood (collected 11-06-22 @ 18:54)  Source: .Blood Blood  Preliminary Report (11-08-22 @ 02:02):    No growth to date.    Culture - Urine (collected 11-04-22 @ 16:27)  Source: Catheterized Catheterized  Final Report (11-06-22 @ 17:54):    50,000 - 99,000 CFU/mL Candida albicans    "Susceptibilities not performed"            INFECTIOUS DISEASES TESTING  COVID-19 PCR: Detected (11-05-22 @ 13:00)      INFLAMMATORY MARKERS      RADIOLOGY & ADDITIONAL TESTS:  I have personally reviewed the last available Chest xray  CXR      CT      CARDIOLOGY TESTING  12 Lead ECG:   Ventricular Rate 88 BPM    Atrial Rate 88 BPM    P-R Interval 144 ms    QRS Duration 74 ms    Q-T Interval 350 ms    QTC Calculation(Bazett) 423 ms    P Axis 61 degrees    R Axis 18 degrees    T Axis 9 degrees    Diagnosis Line Sinus rhythm with Premature atrial complexes  Otherwise normal ECG    Confirmed by LUIS FERNANDO CALDERON MD ([id number]) on 11/7/2022 6:49:37 AM (11-04-22 @ 20:42)      MEDICATIONS  budesonide  80 MICROgram(s)/formoterol 4.5 MICROgram(s) Inhaler 1 Inhalation two times a day  cefTRIAXone   IVPB 1000 IV Intermittent every 24 hours  cyanocobalamin 1000 Oral daily  dicyclomine 20 Oral three times a day before meals  diltiazem    Oral daily  furosemide    Tablet 40 Oral daily  heparin   Injectable 5000 SubCutaneous every 8 hours  ipratropium    for Nebulization 500 Nebulizer every 6 hours  meclizine 12.5 Oral three times a day  melatonin 3 Oral at bedtime  pantoprazole    Tablet 40 Oral before breakfast  predniSONE   Tablet 40 Oral daily  predniSONE   Tablet  Oral   remdesivir  IVPB  IV Intermittent   remdesivir  IVPB 100 IV Intermittent every 24 hours  simvastatin 10 Oral at bedtime  sucralfate Oral Liquid - Peds 1000 Oral two times a day before meals      WEIGHT  Weight (kg): 55 (11-06-22 @ 00:34)  Creatinine, Serum: 0.8 mg/dL (11-08-22 @ 07:53)  Creatinine, Serum: 1.0 mg/dL (11-07-22 @ 18:28)      ANTIBIOTICS:  cefTRIAXone   IVPB 1000 milliGRAM(s) IV Intermittent every 24 hours  remdesivir  IVPB   IV Intermittent   remdesivir  IVPB 100 milliGRAM(s) IV Intermittent every 24 hours      All available historical records have been reviewed      
  TRICIA DONIS  87y  [facility] [place] [id number] [address]      Patient is a 87y old  Female who presents with a chief complaint of AMS (08 Nov 2022 11:27)      INTERVAL HPI/OVERNIGHT EVENTS:    no acute events overnight     REVIEW OF SYSTEMS:  CONSTITUTIONAL: No fever, weight loss, or fatigue  EYES: No eye pain, visual disturbances, or discharge  ENMT:  No difficulty hearing, tinnitus, vertigo; No sinus or throat pain  NECK: No pain or stiffness  BREASTS: No pain, masses, or nipple discharge  RESPIRATORY: No cough, wheezing, chills or hemoptysis; No shortness of breath  CARDIOVASCULAR: No chest pain, palpitations, dizziness, or leg swelling  GASTROINTESTINAL: No abdominal or epigastric pain. No nausea, vomiting, or hematemesis; No diarrhea or constipation. No melena or hematochezia.  GENITOURINARY: No dysuria, frequency, hematuria, or incontinence  NEUROLOGICAL: No headaches, memory loss, loss of strength, numbness, or tremors  SKIN: No itching, burning, rashes, or lesions   LYMPH NODES: No enlarged glands  ENDOCRINE: No heat or cold intolerance; No hair loss  MUSCULOSKELETAL: No joint pain or swelling; No muscle, back, or extremity pain  PSYCHIATRIC: No depression, anxiety, mood swings, or difficulty sleeping  HEME/LYMPH: No easy bruising, or bleeding gums  ALLERY AND IMMUNOLOGIC: No hives or eczema  FAMILY HISTORY:  FH: type 2 diabetes (Sibling)      T(C): 36.4 (11-08-22 @ 04:43), Max: 36.5 (11-07-22 @ 19:59)  HR: 62 (11-08-22 @ 04:43) (62 - 76)  BP: 131/63 (11-08-22 @ 04:43) (131/63 - 137/78)  RR: 18 (11-08-22 @ 04:43) (18 - 18)  SpO2: 99% (11-08-22 @ 04:43) (99% - 99%)  Wt(kg): --Vital Signs Last 24 Hrs  T(C): 36.4 (08 Nov 2022 04:43), Max: 36.5 (07 Nov 2022 19:59)  T(F): 97.5 (08 Nov 2022 04:43), Max: 97.7 (07 Nov 2022 19:59)  HR: 62 (08 Nov 2022 04:43) (62 - 76)  BP: 131/63 (08 Nov 2022 04:43) (131/63 - 137/78)  BP(mean): --  RR: 18 (08 Nov 2022 04:43) (18 - 18)  SpO2: 99% (08 Nov 2022 04:43) (99% - 99%)    Parameters below as of 08 Nov 2022 04:43  Patient On (Oxygen Delivery Method): nasal cannula  O2 Flow (L/min): 2      PHYSICAL EXAM:  GENERAL: NAD, well-groomed, well-developed  HEAD:  Atraumatic, Normocephalic  EYES: EOMI, PERRLA, conjunctiva and sclera clear  ENMT: No tonsillar erythema, exudates, or enlargement; Moist mucous membranes, Good dentition, No lesions  NECK: Supple, No JVD, Normal thyroid  NERVOUS SYSTEM:  Alert & Oriented X3, Good concentration; Motor Strength 5/5 B/L upper and lower extremities; DTRs 2+ intact and symmetric  PULM: Clear to auscultation bilaterally  CARDIAC: Regular rate and rhythm; No murmurs, rubs, or gallops  GI: Soft, Nontender, Nondistended; Bowel sounds present  EXTREMITIES:  2+ Peripheral Pulses, No clubbing, cyanosis, or edema  LYMPH: No lymphadenopathy noted  SKIN: No rashes or lesions    Consultant(s) Notes Reviewed:  [x ] YES  [ ] NO  Care Discussed with Consultants/Other Providers [ x] YES  [ ] NO    LABS:                            9.7    5.59  )-----------( 254      ( 08 Nov 2022 07:53 )             28.8   11-08    147<H>  |  97<L>  |  24<H>  ----------------------------<  107<H>  3.9   |  32  |  0.8    Ca    8.8      08 Nov 2022 07:53  Mg     1.8     11-07    TPro  5.3<L>  /  Alb  3.1<L>  /  TBili  0.2  /  DBili  <0.2  /  AST  18  /  ALT  24  /  AlkPhos  67  11-08            Culture - Blood (collected 06 Nov 2022 18:54)  Source: .Blood Blood  Preliminary Report (08 Nov 2022 02:02):    No growth to date.      acetaminophen     Tablet .. 650 milliGRAM(s) Oral every 6 hours PRN  budesonide  80 MICROgram(s)/formoterol 4.5 MICROgram(s) Inhaler 1 Puff(s) Inhalation two times a day  cyanocobalamin 1000 MICROGram(s) Oral daily  dicyclomine 20 milliGRAM(s) Oral three times a day before meals  diltiazem    milliGRAM(s) Oral daily  furosemide    Tablet 40 milliGRAM(s) Oral daily  guaiFENesin  milliGRAM(s) Oral every 12 hours PRN  heparin   Injectable 5000 Unit(s) SubCutaneous every 8 hours  ipratropium 17 MICROgram(s) HFA Inhaler 1 Puff(s) Inhalation every 6 hours  LORazepam     Tablet 0.5 milliGRAM(s) Oral daily PRN  meclizine 12.5 milliGRAM(s) Oral three times a day  melatonin 3 milliGRAM(s) Oral at bedtime  pantoprazole    Tablet 40 milliGRAM(s) Oral before breakfast  predniSONE   Tablet 40 milliGRAM(s) Oral daily  predniSONE   Tablet   Oral   remdesivir  IVPB   IV Intermittent   remdesivir  IVPB 100 milliGRAM(s) IV Intermittent every 24 hours  simvastatin 10 milliGRAM(s) Oral at bedtime  sucralfate Oral Liquid - Peds 1000 milliGRAM(s) Oral two times a day before meals      HEALTH ISSUES - PROBLEM Dx:          Case Discussed with House Staff      Spectra [phone]  
  TRICIA DONIS  87y  [facility] [address] [place] [id number] [address]      Patient is a 87y old  Female who presents with a chief complaint of AMS (06 Nov 2022 12:52)      INTERVAL HPI/OVERNIGHT EVENTS:    no acute events overnight , arousable , marginal improvement     REVIEW OF SYSTEMS:  Unable to ROS   FAMILY HISTORY:  FH: type 2 diabetes (Sibling)      T(C): 35.3 (11-07-22 @ 06:57), Max: 36.8 (11-06-22 @ 14:16)  HR: 61 (11-07-22 @ 06:57) (61 - 87)  BP: 148/66 (11-07-22 @ 06:57) (139/64 - 148/66)  RR: 18 (11-07-22 @ 06:57) (18 - 19)  SpO2: 96% (11-07-22 @ 06:57) (96% - 96%)  Wt(kg): --Vital Signs Last 24 Hrs  T(C): 35.3 (07 Nov 2022 06:57), Max: 36.8 (06 Nov 2022 14:16)  T(F): 95.5 (07 Nov 2022 06:57), Max: 98.3 (06 Nov 2022 14:16)  HR: 61 (07 Nov 2022 06:57) (61 - 87)  BP: 148/66 (07 Nov 2022 06:57) (139/64 - 148/66)  BP(mean): --  RR: 18 (07 Nov 2022 06:57) (18 - 19)  SpO2: 96% (07 Nov 2022 06:57) (96% - 96%)    Parameters below as of 07 Nov 2022 06:57  Patient On (Oxygen Delivery Method): nasal cannula  O2 Flow (L/min): 2      PHYSICAL EXAM:  GENERAL: NAD,   HEAD:  Atraumatic, Normocephalic  EYES: EOMI, PERRLA, conjunctiva and sclera clear  ENMT: No tonsillar erythema, exudates, or enlargement; Moist mucous membranes, Good dentition, No lesions  NECK: Supple, No JVD, Normal thyroid  NERVOUS SYSTEM: arousable  PULM: Clear to auscultation bilaterally  CARDIAC: Regular rate and rhythm; No murmurs, rubs, or gallops  GI: Soft, Nontender, Nondistended; Bowel sounds present  EXTREMITIES:  2+ Peripheral Pulses, No clubbing, cyanosis, or edema  LYMPH: No lymphadenopathy noted  SKIN: No rashes or lesions    Consultant(s) Notes Reviewed:  [x ] YES  [ ] NO  Care Discussed with Consultants/Other Providers [ x] YES  [ ] NO    LABS:                            9.8    4.88  )-----------( 237      ( 07 Nov 2022 07:19 )             28.4   11-07    139  |  91<L>  |  23<H>  ----------------------------<  123<H>  3.3<L>   |  33<H>  |  1.0    Ca    8.6      07 Nov 2022 07:19  Mg     1.8     11-07    TPro  5.5<L>  /  Alb  3.1<L>  /  TBili  0.3  /  DBili  x   /  AST  20  /  ALT  28  /  AlkPhos  72  11-07            Culture - Urine (collected 04 Nov 2022 16:27)  Source: Catheterized Catheterized  Final Report (06 Nov 2022 17:54):    50,000 - 99,000 CFU/mL Candida albicans    "Susceptibilities not performed"      acetaminophen     Tablet .. 650 milliGRAM(s) Oral every 6 hours PRN  budesonide  80 MICROgram(s)/formoterol 4.5 MICROgram(s) Inhaler 1 Puff(s) Inhalation two times a day  cefTRIAXone   IVPB 1000 milliGRAM(s) IV Intermittent every 24 hours  cyanocobalamin 1000 MICROGram(s) Oral daily  dicyclomine 20 milliGRAM(s) Oral three times a day before meals  diltiazem    milliGRAM(s) Oral daily  furosemide    Tablet 40 milliGRAM(s) Oral daily  guaiFENesin  milliGRAM(s) Oral every 12 hours PRN  heparin   Injectable 5000 Unit(s) SubCutaneous every 8 hours  ipratropium    for Nebulization 500 MICROGram(s) Nebulizer every 6 hours  LORazepam     Tablet 0.5 milliGRAM(s) Oral daily PRN  meclizine 12.5 milliGRAM(s) Oral three times a day  melatonin 3 milliGRAM(s) Oral at bedtime  pantoprazole    Tablet 40 milliGRAM(s) Oral before breakfast  potassium chloride   Powder 40 milliEquivalent(s) Oral once  predniSONE   Tablet 40 milliGRAM(s) Oral daily  predniSONE   Tablet   Oral   remdesivir  IVPB   IV Intermittent   simvastatin 10 milliGRAM(s) Oral at bedtime  sucralfate Oral Liquid - Peds 1000 milliGRAM(s) Oral two times a day before meals      HEALTH ISSUES - PROBLEM Dx:          Case Discussed with House Staff   45 minutes spent on total encounter; more than 50% of the visit was spent counseling and/or coordinating care by the attending physician including speaking to family    Spectra [phone]  
  TRICIA DONIS  87y  [facility] [address] [place] [address] [address]      Patient is a 87y old  Female who presents with a chief complaint of AMS (05 Nov 2022 17:06)      INTERVAL HPI/OVERNIGHT EVENTS:    mildly arousable no events overnight     REVIEW OF SYSTEMS:  Unable to ROS due to MS   FAMILY HISTORY:  FH: type 2 diabetes (Sibling)      T(C): 36.9 (11-06-22 @ 05:03), Max: 37.6 (11-06-22 @ 00:34)  HR: 83 (11-06-22 @ 05:03) (83 - 93)  BP: 138/63 (11-06-22 @ 05:03) (138/63 - 184/79)  RR: 18 (11-06-22 @ 05:03) (18 - 20)  SpO2: 97% (11-06-22 @ 05:03) (97% - 98%)  Wt(kg): --Vital Signs Last 24 Hrs  T(C): 36.9 (06 Nov 2022 05:03), Max: 37.6 (06 Nov 2022 00:34)  T(F): 98.4 (06 Nov 2022 05:03), Max: 99.6 (06 Nov 2022 00:34)  HR: 83 (06 Nov 2022 05:03) (83 - 93)  BP: 138/63 (06 Nov 2022 05:03) (138/63 - 184/79)  BP(mean): --  RR: 18 (06 Nov 2022 05:03) (18 - 20)  SpO2: 97% (06 Nov 2022 05:03) (97% - 98%)    Parameters below as of 06 Nov 2022 05:03  Patient On (Oxygen Delivery Method): nasal cannula  O2 Flow (L/min): 3      PHYSICAL EXAM:  GENERAL: NAD, well-groomed, well-developed  HEAD:  Atraumatic, Normocephalic  EYES: EOMI, PERRLA, conjunctiva and sclera clear  ENMT: No tonsillar erythema, exudates, or enlargement; Moist mucous membranes, Good dentition, No lesions  NECK: Supple, No JVD, Normal thyroid  NERVOUS SYSTEM:  Arousable   PULM: Clear to auscultation bilaterally  CARDIAC: Regular rate and rhythm; No murmurs, rubs, or gallops  GI: Soft, Nontender, Nondistended; Bowel sounds present  EXTREMITIES:  2+ Peripheral Pulses, No clubbing, cyanosis, or edema  LYMPH: No lymphadenopathy noted  SKIN: No rashes or lesions    Consultant(s) Notes Reviewed:  [x ] YES  [ ] NO  Care Discussed with Consultants/Other Providers [ x] YES  [ ] NO    LABS:                            9.7    4.82  )-----------( 208      ( 06 Nov 2022 08:23 )             28.2   11-06    139  |  95<L>  |  15  ----------------------------<  112<H>  3.1<L>   |  34<H>  |  0.5<L>    Ca    8.2<L>      06 Nov 2022 08:23  Mg     2.0     11-06    TPro  5.1<L>  /  Alb  3.3<L>  /  TBili  0.3  /  DBili  x   /  AST  22  /  ALT  30  /  AlkPhos  67  11-06            acetaminophen     Tablet .. 650 milliGRAM(s) Oral every 6 hours PRN  azithromycin  IVPB      azithromycin  IVPB 500 milliGRAM(s) IV Intermittent every 24 hours  budesonide  80 MICROgram(s)/formoterol 4.5 MICROgram(s) Inhaler 1 Puff(s) Inhalation two times a day  cefTRIAXone   IVPB 1000 milliGRAM(s) IV Intermittent every 24 hours  cyanocobalamin 1000 MICROGram(s) Oral daily  dicyclomine 20 milliGRAM(s) Oral three times a day before meals  diltiazem    milliGRAM(s) Oral daily  furosemide    Tablet 40 milliGRAM(s) Oral daily  guaiFENesin  milliGRAM(s) Oral every 12 hours PRN  heparin   Injectable 5000 Unit(s) SubCutaneous every 8 hours  ipratropium    for Nebulization 500 MICROGram(s) Nebulizer every 6 hours  LORazepam     Tablet 0.5 milliGRAM(s) Oral daily PRN  meclizine 12.5 milliGRAM(s) Oral three times a day  melatonin 3 milliGRAM(s) Oral at bedtime  pantoprazole    Tablet 40 milliGRAM(s) Oral before breakfast  predniSONE   Tablet 40 milliGRAM(s) Oral daily  predniSONE   Tablet   Oral   simvastatin 10 milliGRAM(s) Oral at bedtime  sucralfate Oral Liquid - Peds 1000 milliGRAM(s) Oral two times a day before meals      HEALTH ISSUES - PROBLEM Dx:          Case Discussed with House Staff   45 minutes spent on total encounter; more than 50% of the visit was spent counseling and/or coordinating care by the attending physician.   fintonic [id number]  
Patient is a 87y old  Female who presents with a chief complaint of AMS (08 Nov 2022 12:18)    Patient was seen and examined.  c/o pelvic pressure, bladder scan revealed 300cc of urine  Denies any other complaints  All systems reviewed    PAST MEDICAL & SURGICAL HISTORY:  Afib  HTN (hypertension)  NHL (non-Hodgkin&#x27;s lymphoma)    Allergies  No Known Allergies    Home Medications:  budesonide-formoterol 80 mcg-4.5 mcg/inh inhalation aerosol: 1 puff(s) inhaled 2 times a day (04 Nov 2022 19:54)  cyanocobalamin 100 mcg oral tablet: 1 tab(s) orally once a day (04 Nov 2022 19:54)  dicyclomine 10 mg oral capsule: 2 cap(s) orally every 8 hours (04 Nov 2022 19:54)  DilTIAZem (Eqv-Cardizem CD) 360 mg/24 hours oral capsule, extended release: 1 cap(s) orally once a day (04 Nov 2022 19:54)  furosemide 20 mg oral tablet: 1 tab(s) orally once a day (04 Nov 2022 19:54)  heparin 5000 units/mL injectable solution: 1 milliliter(s) injectable every 8 hours (04 Nov 2022 19:54)  ipratropium 21 mcg/inh (0.03%) nasal spray: 2 spray(s) nasal 4 times a day (04 Nov 2022 19:54)  levoFLOXacin 500 mg oral tablet: 1 tab(s) orally every 24 hours (04 Nov 2022 19:54)  LORazepam 0.5 mg oral tablet: 1 tab(s) orally once a day, As Needed (04 Nov 2022 19:54)  meclizine 12.5 mg oral tablet: 1 tab(s) orally 3 times a day (04 Nov 2022 19:54)  Melatonin 3 mg oral tablet: 1 tab(s) orally once a day (at bedtime) (04 Nov 2022 19:54)  pantoprazole 40 mg oral delayed release tablet: 1 tab(s) orally once a day (04 Nov 2022 19:54)  PeriGuard topical ointment: Apply topically to affected area 2 times a day (04 Nov 2022 19:54)  potassium chloride 20 mEq oral granule, extended release: 1 tab(s) orally once a day (04 Nov 2022 19:54)  predniSONE 10 mg oral tablet: 4 tab(s) orally once a day (04 Nov 2022 19:54)  predniSONE 10 mg oral tablet: 3 tab(s) orally once a day (04 Nov 2022 19:54)  predniSONE 10 mg oral tablet: 2 tab(s) orally once a day (04 Nov 2022 19:54)  simvastatin 10 mg oral tablet: 1 tab(s) orally once a day (at bedtime) (04 Nov 2022 19:54)  sucralfate 1 g/10 mL oral suspension: 10 milliliter(s) orally 2 times a day (before meals) (04 Nov 2022 19:54)  Tylenol 325 mg oral tablet: 2 tab(s) orally every 6 hours, As Needed (04 Nov 2022 19:54)    MEDICATIONS  (STANDING):  bacitracin   Ointment 1 Application(s) Topical daily  budesonide  80 MICROgram(s)/formoterol 4.5 MICROgram(s) Inhaler 1 Puff(s) Inhalation two times a day  cyanocobalamin 1000 MICROGram(s) Oral daily  dicyclomine 20 milliGRAM(s) Oral three times a day before meals  diltiazem    milliGRAM(s) Oral daily  furosemide    Tablet 40 milliGRAM(s) Oral daily  heparin   Injectable 5000 Unit(s) SubCutaneous every 8 hours  ipratropium 17 MICROgram(s) HFA Inhaler 1 Puff(s) Inhalation every 6 hours  magnesium sulfate  IVPB 2 Gram(s) IV Intermittent once  meclizine 12.5 milliGRAM(s) Oral three times a day  melatonin 3 milliGRAM(s) Oral at bedtime  pantoprazole    Tablet 40 milliGRAM(s) Oral before breakfast  potassium chloride    Tablet ER 40 milliEquivalent(s) Oral every 4 hours  predniSONE   Tablet 30 milliGRAM(s) Oral daily  predniSONE   Tablet   Oral   remdesivir  IVPB   IV Intermittent   remdesivir  IVPB 100 milliGRAM(s) IV Intermittent every 24 hours  simvastatin 10 milliGRAM(s) Oral at bedtime  sucralfate Oral Liquid - Peds 1000 milliGRAM(s) Oral two times a day before meals  tamsulosin 0.4 milliGRAM(s) Oral once    MEDICATIONS  (PRN):  acetaminophen     Tablet .. 650 milliGRAM(s) Oral every 6 hours PRN Temp greater or equal to 38C (100.4F), Mild Pain (1 - 3)  guaiFENesin  milliGRAM(s) Oral every 12 hours PRN Cough  LORazepam     Tablet 0.5 milliGRAM(s) Oral daily PRN Anxiety    T(C): 36.9 (11-10-22 @ 12:04), Max: 36.9 (11-10-22 @ 12:04)  HR: 88 (11-10-22 @ 12:04) (78 - 93)  BP: 120/73 (11-10-22 @ 12:04) (120/73 - 147/68)  RR: 18 (11-10-22 @ 12:04) (18 - 18)  SpO2: --    O/E:  Awake, alert, not in distress.  HEENT: atraumatic, EOMI.  Chest: decreased breath sounds at bases  CVS: SIS2 +, no murmur.  P/A: Soft, BS+  CNS: awake, alert  Ext: no edema feet.  All systems reviewed positive findings as above.                                   11.3<L>  7.82  )-----------( 292      ( 09 Nov 2022 07:53 )             32.9<L>  11-10    x   |  x   |  x   ----------------------------<  x   x    |  x   |  0.7  11-09    142  |  93<L>  |  21<H>  ----------------------------<  131<H>  3.4<L>   |  36<H>  |  0.6<L>    Ca    8.9      09 Nov 2022 07:53  Mg     1.5     11-09    TPro  4.9<L>  /  Alb  2.9<L>  /  TBili  0.3  /  DBili  <0.2  /  AST  37  /  ALT  25  /  AlkPhos  78  11-10  TPro  5.4<L>  /  Alb  3.3<L>  /  TBili  0.3  /  DBili  <0.2  /  AST  20  /  ALT  23  /  AlkPhos  75  11-09

## 2022-11-11 ENCOUNTER — TRANSCRIPTION ENCOUNTER (OUTPATIENT)
Age: 87
End: 2022-11-11

## 2022-11-11 VITALS
OXYGEN SATURATION: 100 % | DIASTOLIC BLOOD PRESSURE: 63 MMHG | HEART RATE: 78 BPM | RESPIRATION RATE: 18 BRPM | SYSTOLIC BLOOD PRESSURE: 130 MMHG | TEMPERATURE: 97 F

## 2022-11-11 LAB
ALBUMIN SERPL ELPH-MCNC: 3.2 G/DL — LOW (ref 3.5–5.2)
ALP SERPL-CCNC: 72 U/L — SIGNIFICANT CHANGE UP (ref 30–115)
ALT FLD-CCNC: 23 U/L — SIGNIFICANT CHANGE UP (ref 0–41)
AST SERPL-CCNC: 18 U/L — SIGNIFICANT CHANGE UP (ref 0–41)
BILIRUB DIRECT SERPL-MCNC: 0.2 MG/DL — SIGNIFICANT CHANGE UP (ref 0–0.3)
BILIRUB INDIRECT FLD-MCNC: 0.2 MG/DL — SIGNIFICANT CHANGE UP (ref 0.2–1.2)
BILIRUB SERPL-MCNC: 0.4 MG/DL — SIGNIFICANT CHANGE UP (ref 0.2–1.2)
CREAT SERPL-MCNC: 0.8 MG/DL — SIGNIFICANT CHANGE UP (ref 0.7–1.5)
EGFR: 71 ML/MIN/1.73M2 — SIGNIFICANT CHANGE UP
INR BLD: 1.1 RATIO — SIGNIFICANT CHANGE UP (ref 0.65–1.3)
PROT SERPL-MCNC: 5.1 G/DL — LOW (ref 6–8)
PROTHROM AB SERPL-ACNC: 12.6 SEC — SIGNIFICANT CHANGE UP (ref 9.95–12.87)

## 2022-11-11 PROCEDURE — 93306 TTE W/DOPPLER COMPLETE: CPT | Mod: 26

## 2022-11-11 PROCEDURE — 99239 HOSP IP/OBS DSCHRG MGMT >30: CPT

## 2022-11-11 RX ORDER — POTASSIUM CHLORIDE 20 MEQ
1 PACKET (EA) ORAL
Qty: 0 | Refills: 0 | DISCHARGE

## 2022-11-11 RX ORDER — BUDESONIDE AND FORMOTEROL FUMARATE DIHYDRATE 160; 4.5 UG/1; UG/1
1 AEROSOL RESPIRATORY (INHALATION)
Qty: 1 | Refills: 0
Start: 2022-11-11 | End: 2022-12-10

## 2022-11-11 RX ORDER — TAMSULOSIN HYDROCHLORIDE 0.4 MG/1
1 CAPSULE ORAL
Qty: 30 | Refills: 0
Start: 2022-11-11 | End: 2022-12-10

## 2022-11-11 RX ORDER — IPRATROPIUM BROMIDE 0.2 MG/ML
2 SOLUTION, NON-ORAL INHALATION
Qty: 1 | Refills: 0
Start: 2022-11-11 | End: 2022-12-10

## 2022-11-11 RX ORDER — ZINC OXIDE 200 MG/G
1 OINTMENT TOPICAL
Qty: 0 | Refills: 0 | DISCHARGE

## 2022-11-11 RX ORDER — MECLIZINE HCL 12.5 MG
1 TABLET ORAL
Qty: 0 | Refills: 0 | DISCHARGE
Start: 2022-11-11

## 2022-11-11 RX ORDER — POTASSIUM CHLORIDE 20 MEQ
1 PACKET (EA) ORAL
Qty: 30 | Refills: 0
Start: 2022-11-11 | End: 2022-12-10

## 2022-11-11 RX ADMIN — HEPARIN SODIUM 5000 UNIT(S): 5000 INJECTION INTRAVENOUS; SUBCUTANEOUS at 14:50

## 2022-11-11 RX ADMIN — TAMSULOSIN HYDROCHLORIDE 0.4 MILLIGRAM(S): 0.4 CAPSULE ORAL at 21:42

## 2022-11-11 RX ADMIN — Medication 360 MILLIGRAM(S): at 05:19

## 2022-11-11 RX ADMIN — Medication 1000 MILLIGRAM(S): at 07:38

## 2022-11-11 RX ADMIN — Medication 20 MILLIGRAM(S): at 18:39

## 2022-11-11 RX ADMIN — BUDESONIDE AND FORMOTEROL FUMARATE DIHYDRATE 1 PUFF(S): 160; 4.5 AEROSOL RESPIRATORY (INHALATION) at 10:01

## 2022-11-11 RX ADMIN — Medication 40 MILLIGRAM(S): at 05:19

## 2022-11-11 RX ADMIN — HEPARIN SODIUM 5000 UNIT(S): 5000 INJECTION INTRAVENOUS; SUBCUTANEOUS at 21:42

## 2022-11-11 RX ADMIN — Medication 1 PUFF(S): at 21:05

## 2022-11-11 RX ADMIN — Medication 20 MILLIGRAM(S): at 14:50

## 2022-11-11 RX ADMIN — PANTOPRAZOLE SODIUM 40 MILLIGRAM(S): 20 TABLET, DELAYED RELEASE ORAL at 07:38

## 2022-11-11 RX ADMIN — Medication 1 APPLICATION(S): at 12:00

## 2022-11-11 RX ADMIN — HEPARIN SODIUM 5000 UNIT(S): 5000 INJECTION INTRAVENOUS; SUBCUTANEOUS at 05:18

## 2022-11-11 RX ADMIN — Medication 20 MILLIGRAM(S): at 07:38

## 2022-11-11 RX ADMIN — Medication 1 PUFF(S): at 15:01

## 2022-11-11 RX ADMIN — Medication 1000 MILLIGRAM(S): at 18:37

## 2022-11-11 RX ADMIN — SIMVASTATIN 10 MILLIGRAM(S): 20 TABLET, FILM COATED ORAL at 21:42

## 2022-11-11 RX ADMIN — Medication 30 MILLIGRAM(S): at 05:18

## 2022-11-11 NOTE — DISCHARGE NOTE NURSING/CASE MANAGEMENT/SOCIAL WORK - PATIENT PORTAL LINK FT
You can access the FollowMyHealth Patient Portal offered by [facility] by registering at the following website: [email or web address]. By joining [employer]’s FollowMyHealth portal, you will also be able to view your health information using other applications (apps) compatible with our system.

## 2022-11-11 NOTE — DISCHARGE NOTE NURSING/CASE MANAGEMENT/SOCIAL WORK - NSDCPEFALRISK_GEN_ALL_CORE
For information on Fall & Injury Prevention, visit: [email or web address] OR  [email or web address] OR  https://www.cdc.gov/steadi/patient.html

## 2022-11-12 LAB
CULTURE RESULTS: SIGNIFICANT CHANGE UP
SPECIMEN SOURCE: SIGNIFICANT CHANGE UP

## 2022-11-15 PROBLEM — I10 ESSENTIAL (PRIMARY) HYPERTENSION: Chronic | Status: ACTIVE | Noted: 2022-11-04

## 2022-11-15 PROBLEM — C85.90 NON-HODGKIN LYMPHOMA, UNSPECIFIED, UNSPECIFIED SITE: Chronic | Status: ACTIVE | Noted: 2022-11-04

## 2022-11-15 PROBLEM — Z00.00 ENCOUNTER FOR PREVENTIVE HEALTH EXAMINATION: Status: ACTIVE | Noted: 2022-11-15

## 2022-11-15 PROBLEM — I48.91 UNSPECIFIED ATRIAL FIBRILLATION: Chronic | Status: ACTIVE | Noted: 2022-11-04

## 2022-11-16 DIAGNOSIS — E87.0 HYPEROSMOLALITY AND HYPERNATREMIA: ICD-10-CM

## 2022-11-16 DIAGNOSIS — U07.1 COVID-19: ICD-10-CM

## 2022-11-16 DIAGNOSIS — E83.42 HYPOMAGNESEMIA: ICD-10-CM

## 2022-11-16 DIAGNOSIS — N39.0 URINARY TRACT INFECTION, SITE NOT SPECIFIED: ICD-10-CM

## 2022-11-16 DIAGNOSIS — C85.90 NON-HODGKIN LYMPHOMA, UNSPECIFIED, UNSPECIFIED SITE: ICD-10-CM

## 2022-11-16 DIAGNOSIS — Z51.5 ENCOUNTER FOR PALLIATIVE CARE: ICD-10-CM

## 2022-11-16 DIAGNOSIS — G93.41 METABOLIC ENCEPHALOPATHY: ICD-10-CM

## 2022-11-16 DIAGNOSIS — I48.0 PAROXYSMAL ATRIAL FIBRILLATION: ICD-10-CM

## 2022-11-16 DIAGNOSIS — J44.9 CHRONIC OBSTRUCTIVE PULMONARY DISEASE, UNSPECIFIED: ICD-10-CM

## 2022-11-16 DIAGNOSIS — J96.00 ACUTE RESPIRATORY FAILURE, UNSPECIFIED WHETHER WITH HYPOXIA OR HYPERCAPNIA: ICD-10-CM

## 2022-11-16 DIAGNOSIS — E87.6 HYPOKALEMIA: ICD-10-CM

## 2022-11-16 DIAGNOSIS — G47.00 INSOMNIA, UNSPECIFIED: ICD-10-CM

## 2022-11-16 DIAGNOSIS — J15.6 PNEUMONIA DUE TO OTHER GRAM-NEGATIVE BACTERIA: ICD-10-CM

## 2022-11-16 DIAGNOSIS — R41.82 ALTERED MENTAL STATUS, UNSPECIFIED: ICD-10-CM

## 2022-12-08 ENCOUNTER — APPOINTMENT (OUTPATIENT)
Dept: INTERNAL MEDICINE | Facility: CLINIC | Age: 87
End: 2022-12-08

## 2023-03-09 NOTE — ED PROVIDER NOTE - NS ED MD DISPO SPECIAL CONSIDERATION1
Time-based billing (NON-critical care) Time-based billing (NON-critical care) Time-based billing (NON-critical care) None